# Patient Record
Sex: MALE
[De-identification: names, ages, dates, MRNs, and addresses within clinical notes are randomized per-mention and may not be internally consistent; named-entity substitution may affect disease eponyms.]

---

## 2018-11-30 NOTE — CT
CT BRAIN WITHOUT CONTRAST:

 

Date:  11/30/18 

 

HISTORY:  

Headache. 

 

FINDINGS:

No evidence of infarct, hemorrhage, midline shift, or abnormal extra-axial fluid collections are seen
. The ventricular size is normal and the basilar cisterns are patent. The bony calvarium is intact. T
he visualized paranasal sinuses and mastoid air cells are well aerated. 

 

IMPRESSION: 

No CT evidence of acute intracranial process.  

 

POS: [facility]

## 2018-11-30 NOTE — RAD
CHEST 1 VIEW:

 

Date:  11/30/18 

 

INDICATION:

Left leg pain and tingling and left arm pain. 

 

FINDINGS:

There is moderate cardiomegaly, stable to comparison dated 04/16/15. No definite consolidation, pleur
al effusion, or pneumothorax is evident. No acute osseous abnormality is evident. Body habitus slight
ly limits image detail. 

 

IMPRESSION: 

No definite acute abnormality within limitations of exam. Stable cardiomegaly. 

 

 

POS: [facility]

## 2018-11-30 NOTE — HP
REASON FOR ADMISSION:  Left-sided paraesthesias, demand ischemia.



HISTORY OF PRESENTING ILLNESS:  The patient gives history of having left-sided

paraesthesia for almost a week now.  This was mostly in the left upper thigh 
area.

From last 3 days, he developed paraesthesias and numbness, specifically 
numbness and

burning sensation in the left hand from last 3 days.  He went to work this 
morning,

felt dizzy at work and finally made it to the emergency room here.  The patient 
has

not been feeling good and also is sleeping more than his usual sleep hours.  He 
got

concerned, hence came to emergency room.  He works as a supervisor in an 
oilfield

company.  The patient states that he has had a coronary angiogram done 3 years 
back,

which was normal by Dr. Angel.  No prior history of stroke.  He says he is

compliant with his CPAP machine for sleep apnea.  No cough or expectoration.  No

history of fever.  Currently, he is moving all extremities. 



PAST MEDICAL AND SURGICAL HISTORY:  

1. Morbid obesity.

2. Right wrist tendon injury with repair and weakness in the hand secondary to 
that.

3. Obstructive sleep apnea, on CPAP.

4. Seasonal allergies.

5. Cardiac cath done 3 years ago, was normal.



CURRENT MEDICATIONS:  None.



ALLERGIES:  NO KNOWN DRUG ALLERGIES.



PERSONAL HISTORY:  Does not abuse alcohol or drugs.  No history of smoking.



FAMILY HISTORY:  Mother has a history of hypertension and is at bedside here.

Father  of motor vehicle accident at the age of 59 years. 



Code status is full.  Power of  is his mom.



REVIEW OF SYSTEMS:  CONSTITUTIONAL:  Negative for weight loss or gain, ability 
to

conduct usual activities. 

SKIN:  Negative for rash, itching. 

EYES:  Negative for double vision, pain. 

ENT/MOUTH:  Negative for nose bleeding, neck stiffness, pain, tenderness. 

CARDIOVASCULAR:  Negative for palpitations, dyspnea on exertion, orthopnea. 

RESPIRATORY:  Negative for shortness of breath, wheezing, cough, hemoptysis, 
fever

or night sweats. 

GASTROINTESTINAL:  Negative for poor appetite, abdominal pain, heartburn, nausea
,

vomiting, constipation, or diarrhea. 

GENITOURINARY:  Negative for urgency, frequency, dysuria, nocturia. 

MUSCULOSKELETAL:  Negative for pain, swelling. 

NEUROLOGIC/PSYCHIATRIC:  Negative for anxiety, depression. 

ALLERGY/IMMUNOLOGIC:  Negative for skin rash, bleeding tendency.



PHYSICAL EXAMINATION:

GENERAL:  The patient is a 48-year-old male, who is currently not in any acute

distress. 

VITAL SIGNS:  Blood pressure 134/81, pulse 58 per minute, respiratory rate 20 
per

minute, temperature 97.8 degrees Fahrenheit, and saturating 93% on room air. 

NECK:  Supple.  No elevated JVD. 

HEENT:  Eyes; extraocular muscles intact.  Pupils reacting to light.  Oral 
cavity;

mucous membranes are moist.  No exudates or congestion. 

CARDIOVASCULAR:  S1 and S2 heard.  Regular rhythm. 

RESPIRATORY:  Air entry 1+ bilateral.  No rales or rhonchi. 

ABDOMEN:  Soft.  Bowel sounds heard.  No tenderness, rigidity, or guarding. 

EXTREMITIES:  No peripheral edema or calf tenderness. 

VASCULAR SYSTEM:  Peripheral pulses 2+ bilateral.  No ischemic ulcerations or

gangrenes. 

CENTRAL NERVOUS SYSTEM:  Cranial nerves are grossly intact.  Motor system; 
strength

is 5/5 in all 4 extremities except right wrist area, where he has had prior 
tendon

injury with repair.  Reflexes are 2+ bilateral.  Babinski is downgoing.  
Cerebellar

signs are intact.  Gait was not tested. 

PSYCHIATRIC:  The patient's mood is euthymic.  No hallucinations or delusions.



LABORATORY DATA:  CT angio head done shows good flow in the common carotid, 
internal

carotid arteries and major branches.  In the vertebrobasilar system, no 
aneurysm or

significant stenosis or occlusion seen.  No cervical lymphadenopathy. 



CT of brain without contrast done shows no acute intracranial abnormalities. 



Chest x-ray done shows no acute cardiopulmonary abnormalities, but there is mild

cardiomegaly. 



White count of 5, H and H of 14 and 41, platelet count 147, MCV is 93 with 57%

neutrophils.  PT/INR/PTT within normal limits.  Electrolytes are stable, BUN 14,

creatinine 0.8, serum glucose 87.  CK level is 387.  Troponin I is indeterminate
,

peaking up to 0.14.  CK-MB 5.6.  BNP is 41. 



CLINICAL IMPRESSION AND PLAN:  The patient will be under observation on stroke 
unit

for left-sided paraesthesias to rule out transient ischemic attack.  We will 
obtain

MRI without contrast in the morning.  He also has demand ischemia likely from 
his

paraesthesias.  He has had a normal coronary angiogram done just in  with no

flow-limiting disease seen.  He is morbidly obese and also has obstructive sleep

apnea.  We will obtain vitamin B12 and folic acid levels.  He will be on normal

saline at 80 mL per hour for a total of 2 L.  He will be on nitropaste 0.5 inch

every 8 hourly along with the full dose of aspirin and Lipitor at 40 mg q.h.s.  
A

small dose of Lopressor for cardiac protection.  Echo with 2D Doppler will be

obtained for LV function.  We will obtain consultation with Dr. Recinos for

Cardiology and Neurology on-call as well.  The patient is morbidly obese and 
weighs

around 172 kilos.  He has life-threatening obesity and needs to work towards 
losing

his weight with healthy eating and exercising.  The patient had prior interest 
in

doing gastric sleeve procedure 3 years ago, but has given up on that per prior 
records. 







Job ID:  [id number]



MTDD

## 2018-11-30 NOTE — CT
CTA HEAD WITH IV CONTRAST AND 3D POSTPROCESSING 

CTA NECK WITH IV CONTRAST AND 3D POSTPROCESSING:

 

Date:  11/30/18 

 

HISTORY:  

Headache. Left leg pain and tingling for 5 days. Left arm pain for 3 days. 

 

FINDINGS:

There is good flow in the common carotid, internal carotid arteries and their major branches, and the
 vertebrobasilar system. No aneurysm or significant stenosis or major branch occlusion is seen. No ce
rvical lymphadenopathy is seen. The airway is patent. There are degenerative changes in the spine. 

 

IMPRESSION: 

Unremarkable exam. 

 

 

 

[facility]: [facility]

## 2018-12-01 NOTE — PDOC.PN
- Subjective


Encounter Start Date: 12/01/18


Encounter Start Time: 11:30


Subjective: still has left sided parasthesias (numbness and tingling), is 

moving well


-: no chest pain or sob or palp





- Objective


Resuscitation Status - Order Detail:





11/30/18 18:50


Resuscitation Status Routine 


   Resuscitation Status: FULL: Full Resuscitation








MAR Reviewed: Yes


Vital Signs & Weight: 


 Vital Signs (12 hours)











  Temp Pulse Resp BP BP Pulse Ox


 


 12/01/18 11:59  98.5 F  54 L  20   121/81  94 L


 


 12/01/18 08:00  98.3 F  52 L  18   128/78  92 L


 


 12/01/18 06:18   52 L   128/77  


 


 12/01/18 04:00  97.3 F L  56 L  19   119/68  93 L








 Weight











Weight                         379 lb 11.2 oz














I&O: 


 











 11/30/18 12/01/18 12/02/18





 06:59 06:59 06:59


 


Intake Total  645 


 


Balance  645 











Result Diagrams: 


 12/01/18 05:40





 12/01/18 05:40





Phys Exam





- Physical Examination


HEENT: PERRLA, moist MMs


Neck: no JVD, supple


Respiratory: no wheezing, no rales


Cardiovascular: RRR, no significant murmur


Gastrointestinal: soft, non-tender, positive bowel sounds


Musculoskeletal: no edema, pulses present


Neurological: non-focal, moves all 4 limbs


Psychiatric: normal affect, A&O x 3





Dx/Plan


(1) TIA (transient ischemic attack)


Code(s): G45.9 - TRANSIENT CEREBRAL ISCHEMIC ATTACK, UNSPECIFIED   Status: 

Acute   





(2) Demand ischemia of myocardium


Code(s): I24.8 - OTHER FORMS OF ACUTE ISCHEMIC HEART DISEASE   Status: Acute   





(3) Morbid obesity


Code(s): E66.01 - MORBID (SEVERE) OBESITY DUE TO EXCESS CALORIES   Status: 

Chronic   





(4) MAGGIE (obstructive sleep apnea)


Code(s): G47.33 - OBSTRUCTIVE SLEEP APNEA (ADULT) (PEDIATRIC)   Status: Chronic

   





(5) Dyslipidemia


Code(s): E78.5 - HYPERLIPIDEMIA, UNSPECIFIED   Status: Acute   





- Plan


await MRI and echo results


-: had normal cath 3 yrs back


-: no motor deficits clinically


-: add tricor and lipitor to asp


-: dc lovenox full dose and nitropaste (asymptomatic with prior N cath)





* .


await cardio and neuro opinion.





Review of Systems





- Medications/Allergies


Allergies/Adverse Reactions: 


 Allergies











Allergy/AdvReac Type Severity Reaction Status Date / Time


 


No Known Allergies Allergy   Verified 12/01/18 01:30











Medications: 


 Current Medications





Acetaminophen (Tylenol)  650 mg PO Q4H PRN


   PRN Reason: Headache/Fever/Mild Pain (1-3)


   Last Admin: 12/01/18 08:55 Dose:  650 mg


Aspirin (Ecotrin)  325 mg PO DAILY [facility]


   Last Admin: 12/01/18 08:55 Dose:  325 mg


Atorvastatin Calcium (Lipitor)  40 mg PO HS [facility]


   Last Admin: 11/30/18 21:56 Dose:  40 mg


Calcium Carbonate (Tums)  1,000 mg PO Q4H PRN


   PRN Reason: Heartburn  or Indigestion


Famotidine (Pepcid)  20 mg PO BID [facility]


   Last Admin: 12/01/18 08:55 Dose:  20 mg


Guaifenesin/Dextromethorphan (Robitussin Dm)  15 ml PO Q4H PRN


   PRN Reason: Cough


Sodium Chloride (Normal Saline 0.9%)  1,000 mls @ 80 mls/hr IV .[id number] [facility]


   Stop: 12/01/18 19:59


   Last Admin: 12/01/18 09:47 Dose:  1,000 mls


Metoprolol Tartrate (Lopressor)  12.5 mg PO BID [facility]


   Last Admin: 12/01/18 09:47 Dose:  12.5 mg


Sodium Chloride (Flush - Normal Saline)  10 ml IVF PRN PRN


   PRN Reason: Saline Flush

## 2018-12-01 NOTE — CON
DATE OF CONSULTATION:  12/01/2018



REASON FOR NEUROLOGY CONSULT:  Left-sided paresthesias.



HISTORY OF PRESENT ILLNESS:  This is a 48-year-old male, who states that for about a

week, he has had symptoms on his left side.  He states that his left upper leg

experiences a burning pain and numbness on the anterolateral aspect of his left

thigh.  It is made worse if he walks.  It gets better if he sits.  It may last for

few seconds or few minutes.  The other thing he has experienced is he gets a burning

pain and numbness in his left arm.  He says it seems to start in his fingers, mainly

the middle three fingers on the left hand.  It often occurs during sleep and wakes

him up.  He states that there is a burning pain associated with this that emanates

from his fingers up his arm towards his shoulder.  He says that it seems to get

worse during sleep.  He typically sleeps on his back and holds the pillow to his

chest with his arms crossed over the pillow.  He uses a CPAP machine.  He has been

on CPAP for severe obstructive sleep apnea over the last 8 years.  He states that

his first CPAP worked very well, and he would feel very rested after 4 hours of

sleep.  Now on the current CPAP, he sleeps 12 or 13 hours and does not feel rested

when he wakes up.  His chronic medical problems are significant for sleep apnea,

severe obesity, chronic right hand weakness due to an old tendon injury on his right

hand. 



PAST MEDICAL HISTORY:  Significant for sleep apnea.  He has had a surgery couple of

years ago for what was thought to be a chest tumor.  He underwent thoracotomy on the

lateral side of the chest.  He states that it turned out to be just part of his

enlarged heart and not a tumor.  He states that he used to be in senior care in the past,

and while he was in senior care, he was on a more healthier diet than he is now, and he

lost 160 pounds.  Gradually, since he has been out of senior care, he eats more fast food

and has been gradually gaining more weight.  He has a history of chest pain in the

past, but he had a cardiac cath, which was negative several years ago.  He has had

surgery on his right wrist for the tendon damage. 



SOCIAL HISTORY:  He does not smoke or drink.  He drives truck a lot, hours a day for

oil refinery, and he also drives a tractor a lot. 



FAMILY HISTORY:  Positive for coronary artery disease and diabetes.



REVIEW OF SYSTEMS:  A 14-point review of systems is negative except for the numbness

and pain episodes, also sleeping a lot.  He also noted that he has filters in his

house, which has an old central air unit, will get very dirty in just a matter of a

couple of weeks.  He and his brother live there at the house.  They have been

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

be that is contributing to some of his problems. 



His mother is present at this time.



PHYSICAL EXAMINATION:

GENERAL:  He is awake, alert, oriented x3.  In general, he is morbidly obese. 

VITAL SIGNS:  Weight is recorded in the chart at 379 pounds. 

HEENT:  Normal. 

LUNGS:  Clear. 

HEART:  No murmurs.  Regular rhythm. 

ABDOMEN:  Protuberant.  No masses. 

EXTREMITIES:  No clubbing, cyanosis or edema. 

SKIN:  No rashes. 

MUSCULOSKELETAL:  Joints, no swelling.  Right hand fingers are little weak with

limited range of motion.  There is a positive Phalen's sign on flexion of his left

wrist. 

NEUROLOGIC:  He is awake, alert, and oriented x3.  Cranial nerves 2 through 12

tested normally.  Pupils 2 mm and reactive.  Discs are sharp.  Motor, 5/5 strength

in the arms and legs except for the right hand chronic injury to the tendon.  DTRs

are 1+.  Toes are downgoing.  Sensation is normal to light touch.  Coordination,

finger to nose and heel to shin is normal.  Gait normal. 



REVIEW OF STUDIES:  Show CT of the brain and CT of the neck, does not show any

significant stenosis of the arteries.  CAT scan of the brain without contrast is

normal.  Note that an MRI of the brain was ordered; however, the MRI machine cannot

accommodate him due to size.  Other test results; hemoglobin is 13.7, hematocrit

40.7, platelets 143,000, white count 5.1.  Troponins were elevated.  There were

several, 0.134, 0.140, 0.139, and 0.140.  Normal is less than 0.028.  Sodium is 136,

potassium 4.5, chloride 107, CO2 of 22, BUN 17, creatinine 0.82, glucose was 95,

calcium 8.7.  Triglycerides elevated at 271.  Cholesterol 187.  LDL 93, HDL 40, B12

level is 987, which is okay.  Folate is 12.30, which is normal.  Pro-time 13.0, INR

1.0, PTT is 32.8. 



MEDICATIONS:  Currently, he is on;

1. Lipitor.

2. Pepcid.

3. Tricor.

4. Metoprolol.

5. Aspirin 325 mg a day.



IMPRESSION:  

1. Episodes of left arm burning pain and tingling and left thigh burning and

tingling.  He did also notice some numbness of the left face.  These symptoms could

be consistent with transient ischemic attacks; however, could also be consistent and

probably more likely due to peripheral nerve problems, possibly cervical

radiculopathy and/or left carpal tunnel syndrome, also possibility of lumbar

radiculopathy and/or lateral cutaneous nerve of thigh syndrome on the left.  Of

note, the MRI machine here cannot accommodate him. 

2. Also I suspect that his sleep apnea has not been adequately treated because he is

sleeping 12 or 13 hours a day and wakes up feeling un-rested.  This can also lead to

strokes or cardiac problems or heart attacks or arrhythmias, which could in turn

lead to transient ischemic attacks or strokes. 



PLAN:  Recommend echocardiogram, and I will see if that has been ordered.  Also

recommend  to help him get an MRI of the brain, MRI of the cervical

spine, and MRI of the lumbar spine.  These could of course be done as an outpatient.

 The patient also needs to be set up with an outpatient neurologist, so that he can

get nerve testing for cervical and/or lumbar radiculopathy and possible carpal

tunnel syndrome on the left.  Also recommend that he see a sleep specialist and have

a sleep study done with his current CPAP.  This may need to be adjusted or he may

need to have a new machine done.  It appears that his sleep apnea is not being

adequately treated, which can lead to heart problem, strokes, arrhythmias, and also

more weight gain.  He sees a Cardiology.  He has also been consulted for his

elevated troponins.  I recommend that his thyroid be checked, and hemoglobin A1c.  I

see that a random blood sugar was not elevated; however, it does run in his family,

in his brother.  Also recommend that he try not to burn the candles in his house and

may need to have his air conditioning unit replaced or inspected and possibly the

air ducts cleaned or replaced.  I discussed with the patient, his mother is also

present. 





Job ID:  [id number]

## 2018-12-02 NOTE — PDOC.PN
- Subjective


Encounter Start Date: 12/02/18


Encounter Start Time: 09:00


Subjective: no new complaints


-: is ambulating in room


-: no chest pain or palp





- Objective


Resuscitation Status - Order Detail:





11/30/18 18:50


Resuscitation Status Routine 


   Resuscitation Status: FULL: Full Resuscitation








MAR Reviewed: Yes


Vital Signs & Weight: 


 Vital Signs (12 hours)











  Temp Pulse Resp BP Pulse Ox


 


 12/02/18 11:41  98.8 F  54 L  18  143/83 H  95


 


 12/02/18 08:00  97.9 F  52 L  16  137/77  95


 


 12/02/18 05:41      95


 


 12/02/18 04:00  97.8 F  48 L  19  135/78  95


 


 12/02/18 00:00  97.7 F  57 L  19  123/84  94 L








 Weight











Weight                         379 lb 11.2 oz














I&O: 


 











 12/01/18 12/02/18 12/03/18





 06:59 06:59 06:59


 


Intake Total 645 1347 


 


Balance 645 1347 











Result Diagrams: 


 12/01/18 05:40





 12/01/18 05:40





Phys Exam





- Physical Examination


HEENT: PERRLA, moist MMs


Neck: no JVD, supple


Respiratory: no wheezing, no rales


Cardiovascular: RRR, no significant murmur


Gastrointestinal: soft, non-tender, positive bowel sounds


Musculoskeletal: no edema, pulses present


Neurological: non-focal, moves all 4 limbs


Psychiatric: normal affect, A&O x 3





Dx/Plan


(1) TIA (transient ischemic attack)


Code(s): G45.9 - TRANSIENT CEREBRAL ISCHEMIC ATTACK, UNSPECIFIED   Status: 

Acute   





(2) Demand ischemia of myocardium


Code(s): I24.8 - OTHER FORMS OF ACUTE ISCHEMIC HEART DISEASE   Status: Acute   





(3) Morbid obesity


Code(s): E66.01 - MORBID (SEVERE) OBESITY DUE TO EXCESS CALORIES   Status: 

Chronic   





(4) MAGGIE (obstructive sleep apnea)


Code(s): G47.33 - OBSTRUCTIVE SLEEP APNEA (ADULT) (PEDIATRIC)   Status: Chronic

   





(5) Dyslipidemia


Code(s): E78.5 - HYPERLIPIDEMIA, UNSPECIFIED   Status: Acute   





- Plan


hemo/neurostable


-: Cannot fit in MRI machine, no focal motor deficits


-: is going for stress test, dc home if -ve


-: echo showed normal ef and wm


-: on asp, lipitor and tricor, dc lopressor (HR 50's)





* .








Review of Systems





- Medications/Allergies


Allergies/Adverse Reactions: 


 Allergies











Allergy/AdvReac Type Severity Reaction Status Date / Time


 


No Known Allergies Allergy   Verified 12/01/18 01:30











Medications: 


 Current Medications





Acetaminophen (Tylenol)  650 mg PO Q4H PRN


   PRN Reason: Headache/Fever/Mild Pain (1-3)


   Last Admin: 12/01/18 08:55 Dose:  650 mg


Aspirin (Ecotrin)  325 mg PO DAILY [facility]


   Last Admin: 12/02/18 09:43 Dose:  325 mg


Atorvastatin Calcium (Lipitor)  40 mg PO HS [facility]


   Last Admin: 12/01/18 20:42 Dose:  40 mg


Calcium Carbonate (Tums)  1,000 mg PO Q4H PRN


   PRN Reason: Heartburn  or Indigestion


Famotidine (Pepcid)  20 mg PO BID [facility]


   Last Admin: 12/02/18 09:44 Dose:  20 mg


Fenofibrate (Tricor)  48 mg PO DAILY [facility]


   Last Admin: 12/02/18 09:43 Dose:  48 mg


Guaifenesin/Dextromethorphan (Robitussin Dm)  15 ml PO Q4H PRN


   PRN Reason: Cough


Sodium Chloride (Flush - Normal Saline)  10 ml IVF PRN PRN


   PRN Reason: Saline Flush


   Last Admin: 12/02/18 09:44 Dose:  10 ml

## 2018-12-03 NOTE — NM
NUCLEAR MEDICINE CARDIAC PERFUSION EXAMINATION WITH EJECTION FRACTION:

 

COMPARISON:  

None. 

 

HISTORY:  

48-year-old male with chest pain. 

 

TECHNIQUE:  

A two day nuclear medicine cardiac perfusion examination was performed. Rest images were obtained usi
ng 30.9 mCi of technetium-99m sestamibi. Stress images were obtained using 33 mCi of technetium-99m s
estamibi and Lexiscan. 

 

FINDINGS:

Tomographic images show no fixed or reversible perfusion defects. Gated images show normal wall motio
n with an ejection fraction of 67%. 

 

EDV:  152 ml

LHR:  0.2

TID:  0.9

 

IMPRESSION: 

No evidence of ischemia.  

 

POS: [facility]

## 2018-12-03 NOTE — DIS
DATE OF ADMISSION:  11/30/2018



DATE OF DISCHARGE:  12/03/2018



DISCHARGE DISPOSITION:  Home.



PRIMARY DISCHARGE DIAGNOSES:  Transient ischemic attack, resolved; demand 
ischemia,

stable with negative stress test. 



SECONDARY DISCHARGE DIAGNOSES:  Morbid obesity; obstructive sleep apnea, on CPAP
;

dyslipidemia. 



PROCEDURES DONE DURING HOSPITALIZATION:  Echo with 2D Doppler showed an EF of 50
% to

55%.  Nuclear stress test done showed no reversible ischemia.  There were no 
fixed

defects either.  Ejection fraction was 67% with normal wall motion.  CT angio of

brain was unremarkable.  CT of brain done without contrast showed no acute

intracranial process.  Chest x-ray done showed no acute infiltrate.  Hemoglobin 
and

hematocrit were 13 and 40, platelet count 143.  Total cholesterol 187, 
triglycerides

271, LDL 93, HDL 40.  B12 level is 987.  Folic acid 12.3.  TSH 1.76.  Troponin 
was

indeterminate peaking up to 0.14, CK-MB 5.6. 



DISCHARGE MEDICATIONS:  

1. Aspirin 325 mg p.o. daily.

2. Tricor 48 mg p.o. daily.

3. Lipitor 40 mg p.o. daily.



ALLERGIES:  NO KNOWN DRUG ALLERGIES.



INPATIENT CONSULTS:  Dr. Amina Galeas for Neurology, Dr. Recinos for Cardiology.



DISCHARGE PLAN:  The patient to follow up with primary care physician in 1 week.



BRIEF COURSE DURING HOSPITALIZATION:  The patient initially got admitted with

complaints of left-sided tingling and numbness.  This was more in the hand area 
and

left thigh area.  The patient has had initial CT brain without contrast and CT 
angio

of brain done which did not reveal any acute abnormality.  He had indeterminate

troponin as well.  He was admitted to stroke unit and has had a complete neuro 
and

cardiac workup done.  The patient has had nuclear stress test done, which 
showed no

fixed or reversible defects.  In fact, he has had a cardiac catheterization 
done 3

years back, which showed no flow-limiting disease.  He was evaluated by

neurologist as well.  The patient will need outpatient EMG studies via his 
primary

care physician's referral.  The patient also has some issues with his CPAP 
machine

and has had sleep studies done in the last three years or so.  He was advised to

call his pulmonologist or sleep medicine physician and rectify the issues.  He 
is

ambulating and eating well.  No focal motor deficits were noted.  He was 
optimized

on medications for hypertriglyceridemia and dyslipidemia.  He is hemodynamically

stable and will be shortly discharged home. Patient was seen and examined on 
the day

of discharge. Also  was counselled regarding his life threatening 
obesity and 

options of bariatric procedure if he failed diet and exercise regimens.







Job ID:  [id number]



MTDD

## 2018-12-03 NOTE — CON
DATE OF CONSULTATION:  



HISTORY OF PRESENT ILLNESS:  Maximo Jimenez is a 48-year-old  male,

who has been evaluated by Dr. Angel in the past.  He was seen in the office for

preoperative evaluation prior to gastric sleeve surgery, and a stress test was

scheduled.  However, Mr. Jimenez decided against bariatric surgery and also did not

have the stress test.  He then presented in April 2015, with chest discomfort with 3

troponins in the indeterminate range.  He underwent cardiac catheterization, which

revealed normal coronary arteries. 



He now is admitted with complaints of left thigh pain as well as development of

paresthesias and pain in his left arm.  He denies any chest discomfort or shortness

of breath.  He states that he had been feeling very fatigued and decided to come for

evaluation. 



PAST MEDICAL HISTORY:  Morbid obesity, obstructive sleep apnea, and normal coronary

arteries in 2015. 



PAST SURGICAL HISTORY:  Right wrist surgery.  He also states that he underwent a

left thoracotomy to remove a tumor; however, apparently, none was found at the time

of the surgery.  This was in [place]. 



MEDICATIONS:  None.



ALLERGIES:  NONE.



SOCIAL HISTORY:  He does not smoke or drink.



FAMILY HISTORY:  Negative for coronary artery disease.



REVIEW OF SYSTEMS:  Twelve-point review of systems is unremarkable except as noted

above. 



PHYSICAL EXAMINATION:

VITAL SIGNS:  Blood pressure 135/78, pulse of 48.  He was on metoprolol 12.5 b.i.d.,

however, this has been discontinued. 

HEENT:  PERRL. 

NECK:  Supple. 

CHEST:  Clear. 

CARDIAC:  S1 and S2 are normal without any S3, S4, or murmurs.  Carotid upstroke is

normal without bruits. 

ABDOMEN:  Morbidly obese.  Normal bowel sounds.  No tenderness. 

EXTREMITIES:  Reveal trace pretibial edema. 

NEUROLOGICAL:  Grossly intact.



LABORATORY DATA:  EKG reveals sinus bradycardia with nonspecific intraventricular

conduction block.  Hemoglobin 13.7, hematocrit 40.7, white count 5100, and platelets

143,000.  Sodium 136, potassium 4.5, chloride 107, carbon dioxide 22, BUN 17, and

creatinine 0.82.  Troponin I has been up to 0.140.  Cholesterol 187, triglycerides

271, HDL 40, and LDL 93.  TSH is normal.  CK-MB is normal. 



IMPRESSION:  

1. Left leg pain, left arm pain and paresthesias, currently being evaluated by

Neurology. 

2. Indeterminate troponin I with history of normal coronary arteries 3 years ago.

3. Morbid obesity.

4. Obstructive sleep apnea.

5. History of left thoracotomy.



PLAN:  Mr. Jimenez had normal coronary arteries on catheterization 3 years ago.

Also, at that time, he had indeterminate troponin I.  However, they certainly are

higher this time.  He will undergo adenosine Cardiolite testing for further

evaluation.  I agree with discontinuation of the beta-blocker with his bradycardia. 







Job ID:  [id number]

## 2018-12-03 NOTE — PDOC.PN
- Subjective


Encounter Start Date: 12/03/18


Encounter Start Time: 09:00


Subjective: no chest pain or sob


-: no new symptoms


-: is amb in room





- Objective


Resuscitation Status - Order Detail:





11/30/18 18:50


Resuscitation Status Routine 


   Resuscitation Status: FULL: Full Resuscitation








MAR Reviewed: Yes


Vital Signs & Weight: 


 Vital Signs (12 hours)











  Temp Pulse Resp BP Pulse Ox


 


 12/03/18 07:30  98.6 F  54 L  18  139/80  95


 


 12/03/18 04:00  97.6 F  60  18  119/90  94 L


 


 12/02/18 23:46  98.5 F  54 L  18  134/72  94 L








 Weight











Weight                         379 lb














I&O: 


 











 12/02/18 12/03/18 12/04/18





 06:59 06:59 06:59


 


Intake Total 1347 1080 


 


Balance 1347 1080 











Result Diagrams: 


 12/01/18 05:40





 12/01/18 05:40





Phys Exam





- Physical Examination


HEENT: PERRLA, moist MMs


Neck: no JVD, supple


Respiratory: no wheezing, no rales


Cardiovascular: RRR, no significant murmur


Gastrointestinal: soft, non-tender, positive bowel sounds


Musculoskeletal: no edema, pulses present


Neurological: non-focal, moves all 4 limbs


Psychiatric: normal affect, A&O x 3





Dx/Plan


(1) TIA (transient ischemic attack)


Code(s): G45.9 - TRANSIENT CEREBRAL ISCHEMIC ATTACK, UNSPECIFIED   Status: 

Resolved   





(2) Demand ischemia of myocardium


Code(s): I24.8 - OTHER FORMS OF ACUTE ISCHEMIC HEART DISEASE   Status: Acute   

Comment: stable   





(3) Morbid obesity


Code(s): E66.01 - MORBID (SEVERE) OBESITY DUE TO EXCESS CALORIES   Status: 

Chronic   





(4) MAGGIE (obstructive sleep apnea)


Code(s): G47.33 - OBSTRUCTIVE SLEEP APNEA (ADULT) (PEDIATRIC)   Status: Chronic

   





(5) Dyslipidemia


Code(s): E78.5 - HYPERLIPIDEMIA, UNSPECIFIED   Status: Acute   





- Plan


await stress test results, if -ve home


-: hemo/neuro stable


-: needs outpt emg and f/u


-: continue asp, lip, tricor





* .








Review of Systems





- Medications/Allergies


Allergies/Adverse Reactions: 


 Allergies











Allergy/AdvReac Type Severity Reaction Status Date / Time


 


No Known Allergies Allergy   Verified 12/01/18 01:30











Medications: 


 Current Medications





Acetaminophen (Tylenol)  650 mg PO Q4H PRN


   PRN Reason: Headache/Fever/Mild Pain (1-3)


   Last Admin: 12/01/18 08:55 Dose:  650 mg


Aspirin (Ecotrin)  325 mg PO DAILY [facility]


   Last Admin: 12/03/18 09:30 Dose:  325 mg


Atorvastatin Calcium (Lipitor)  40 mg PO HS [facility]


   Last Admin: 12/02/18 20:45 Dose:  40 mg


Calcium Carbonate (Tums)  1,000 mg PO Q4H PRN


   PRN Reason: Heartburn  or Indigestion


Famotidine (Pepcid)  20 mg PO BID [facility]


   Last Admin: 12/03/18 09:30 Dose:  20 mg


Fenofibrate (Tricor)  48 mg PO DAILY [facility]


   Last Admin: 12/03/18 09:34 Dose:  48 mg


Guaifenesin/Dextromethorphan (Robitussin Dm)  15 ml PO Q4H PRN


   PRN Reason: Cough


Sodium Chloride (Flush - Normal Saline)  10 ml IVF PRN PRN


   PRN Reason: Saline Flush


   Last Admin: 12/02/18 20:45 Dose:  10 ml

## 2018-12-03 NOTE — PRG
DATE OF SERVICE:  12/02/2018



NEUROLOGY FOLLOWUP NOTE



SUBJECTIVE:  Following up for left arm numbness and pain and left thigh numbness and

burning.  The patient states that he still has the same symptoms, which come and go.

 There is no weakness associated with this.  He does note that the numbness and

burning of the arm can wake him up from sleep.  He also uses a CPAP at home for

severe obstructive sleep apnea.  He does not think that his current machine is

working because he still sleeps 12 hours a day or more and still wakes up

unrefreshed. 



The MRI here was attempted, but not able to accommodate his size.



OBJECTIVE:  VITAL SIGNS:  Blood pressure 135/67, pulse 48, respiratory rate 18,

temperature 98.4, O2 saturation is 95% on room air. 

HEENT:  Normal. 

GENERAL:  He is morbidly obese with a recorded weight of 379 pounds. 

LUNGS:  Clear. 

EXTREMITIES:  No edema. 

NEUROLOGIC:  He is awake, alert.  Cranial nerves 2 through 12 test normally.  Motor;

5/5 strength in arms and legs.  DTRs are 1+.  Toes are downgoing.  Sensation is

intact except for a slight area of numbness in the lateral anterior area of the left

thigh. 



REVIEW OF TEST RESULTS:  Showed that CTA of the head and neck and CAT scan of the

brain were normal.  He had an echocardiogram, which showed technically difficult

exam.  Left atrium moderately dilated.  There was a chest x-ray done on 11/30/2018,

that showed moderate cardiomegaly stable to an exam that was done on April 16, 2015.

 There was no definite consolidation, pleural effusion, or pneumothorax.  Body

habitus slightly limited the exam detail. 



IMPRESSION:  Episodic numbness, tingling, and burning pain in the left hand, which

goes up his left arm and also in the anterolateral aspect of his left thigh.  The

symptoms could be consistent with transient ischemic attack, however, could also be

consistent with a cervical and/or lumbar radiculopathy and could be consistent with

left carpal tunnel syndrome and left meralgia paresthetica.  He also sleeps

excessive amount, and I suspect that his CPAP is not working. 



PLAN:   assistant to help the patient get an outpatient open MRI of the

brain, cervical spine, and lumbar spine, and also follow up with an outpatient

neurologist, so he can get nerve testing for cervical and lumbar radiculopathy and

possible carpel tunnel syndrome on the left, and also recommend that he see a sleep

specialist to have a sleep study done with his CPAP.  I will order a wrist splint

for his left upper extremity.  I do not have any further neurological

recommendations at this time.  Neurology will sign off. 







Job ID:  [id number]

## 2019-02-28 ENCOUNTER — HOSPITAL ENCOUNTER (EMERGENCY)
Dept: HOSPITAL 57 - BURERS | Age: 49
Discharge: HOME | End: 2019-02-28
Payer: COMMERCIAL

## 2019-02-28 ENCOUNTER — HOSPITAL ENCOUNTER (OUTPATIENT)
Dept: HOSPITAL 92 - ERS | Age: 49
Setting detail: OBSERVATION
LOS: 1 days | Discharge: HOME | End: 2019-03-01
Attending: INTERNAL MEDICINE | Admitting: INTERNAL MEDICINE
Payer: COMMERCIAL

## 2019-02-28 VITALS — BODY MASS INDEX: 51 KG/M2

## 2019-02-28 DIAGNOSIS — R74.8: Primary | ICD-10-CM

## 2019-02-28 DIAGNOSIS — E66.01: ICD-10-CM

## 2019-02-28 DIAGNOSIS — G47.33: ICD-10-CM

## 2019-02-28 DIAGNOSIS — Z79.82: ICD-10-CM

## 2019-02-28 DIAGNOSIS — R42: ICD-10-CM

## 2019-02-28 DIAGNOSIS — Z79.899: ICD-10-CM

## 2019-02-28 DIAGNOSIS — J96.90: Primary | ICD-10-CM

## 2019-02-28 DIAGNOSIS — Z99.89: ICD-10-CM

## 2019-02-28 DIAGNOSIS — E78.00: ICD-10-CM

## 2019-02-28 DIAGNOSIS — E78.5: ICD-10-CM

## 2019-02-28 LAB
ALBUMIN SERPL BCG-MCNC: 4.2 G/DL (ref 3.5–5)
ALP SERPL-CCNC: 54 U/L (ref 40–150)
ALT SERPL W P-5'-P-CCNC: 40 U/L (ref 8–55)
ANALYZER IN CARDIO: (no result)
ANION GAP SERPL CALC-SCNC: 11 MMOL/L (ref 10–20)
APAP SERPL-MCNC: (no result) MCG/ML (ref 10–30)
AST SERPL-CCNC: 32 U/L (ref 5–34)
BASE EXCESS STD BLDA CALC-SCNC: 2.2 MEQ/L
BASOPHILS # BLD AUTO: 0 THOU/UL (ref 0–0.2)
BASOPHILS NFR BLD AUTO: 0.9 % (ref 0–1)
BILIRUB SERPL-MCNC: 0.5 MG/DL (ref 0.2–1.2)
BUN SERPL-MCNC: 22 MG/DL (ref 8.9–20.6)
CA-I BLDA-SCNC: 1.16 MMOL/L (ref 1.12–1.3)
CALCIUM SERPL-MCNC: 9.1 MG/DL (ref 7.8–10.44)
CHLORIDE SERPL-SCNC: 108 MMOL/L (ref 98–107)
CK MB SERPL-MCNC: 5.5 NG/ML (ref 0–6.6)
CK MB SERPL-MCNC: 6.2 NG/ML (ref 0–6.6)
CK MB SERPL-MCNC: 7 NG/ML (ref 0–6.6)
CO2 SERPL-SCNC: 25 MMOL/L (ref 22–29)
CREAT CL PREDICTED SERPL C-G-VRATE: 0 ML/MIN (ref 70–130)
EOSINOPHIL # BLD AUTO: 0.2 THOU/UL (ref 0–0.7)
EOSINOPHIL NFR BLD AUTO: 3.6 % (ref 0–10)
GLOBULIN SER CALC-MCNC: 2.6 G/DL (ref 2.4–3.5)
GLUCOSE SERPL-MCNC: 107 MG/DL (ref 70–105)
HCO3 BLDA-SCNC: 26.7 MEQ/L (ref 22–28)
HCT VFR BLDA CALC: 42 % (ref 42–52)
HGB BLD-MCNC: 13.9 G/DL (ref 14–18)
HGB BLDA-MCNC: 14.3 G/DL (ref 14–18)
LYMPHOCYTES # BLD AUTO: 1.2 THOU/UL (ref 1.2–3.4)
LYMPHOCYTES NFR BLD AUTO: 24.6 % (ref 21–51)
MCH RBC QN AUTO: 31.2 PG (ref 27–31)
MCV RBC AUTO: 92 FL (ref 78–98)
MONOCYTES # BLD AUTO: 0.4 THOU/UL (ref 0.11–0.59)
MONOCYTES NFR BLD AUTO: 9.3 % (ref 0–10)
NEUTROPHILS # BLD AUTO: 2.9 THOU/UL (ref 1.4–6.5)
NEUTROPHILS NFR BLD AUTO: 61.6 % (ref 42–75)
O2 A-A PPRESDIFF RESPIRATORY: 24.86 MM[HG] (ref 0–20)
PCO2 BLDA: 41.1 MMHG (ref 35–45)
PH BLDA: 7.43 [PH] (ref 7.35–7.45)
PLATELET # BLD AUTO: 125 THOU/UL (ref 130–400)
PO2 BLDA: 73.5 MMHG (ref 80–100)
POTASSIUM BLD-SCNC: 3.73 MMOL/L (ref 3.7–5.3)
POTASSIUM SERPL-SCNC: 3.9 MMOL/L (ref 3.5–5.1)
RBC # BLD AUTO: 4.46 MILL/UL (ref 4.7–6.1)
SALICYLATES SERPL-MCNC: (no result) MG/DL (ref 15–30)
SODIUM SERPL-SCNC: 140 MMOL/L (ref 136–145)
SPECIMEN DRAWN FROM PATIENT: (no result)
TROPONIN I SERPL DL<=0.01 NG/ML-MCNC: 0.17 NG/ML (ref ?–0.03)
WBC # BLD AUTO: 4.7 THOU/UL (ref 4.8–10.8)

## 2019-02-28 PROCEDURE — 93005 ELECTROCARDIOGRAM TRACING: CPT

## 2019-02-28 PROCEDURE — 96360 HYDRATION IV INFUSION INIT: CPT

## 2019-02-28 PROCEDURE — 85379 FIBRIN DEGRADATION QUANT: CPT

## 2019-02-28 PROCEDURE — 85025 COMPLETE CBC W/AUTO DIFF WBC: CPT

## 2019-02-28 PROCEDURE — 36415 COLL VENOUS BLD VENIPUNCTURE: CPT

## 2019-02-28 PROCEDURE — 96372 THER/PROPH/DIAG INJ SC/IM: CPT

## 2019-02-28 PROCEDURE — 82553 CREATINE MB FRACTION: CPT

## 2019-02-28 PROCEDURE — 80053 COMPREHEN METABOLIC PANEL: CPT

## 2019-02-28 PROCEDURE — 84484 ASSAY OF TROPONIN QUANT: CPT

## 2019-02-28 PROCEDURE — 82550 ASSAY OF CK (CPK): CPT

## 2019-02-28 PROCEDURE — 82805 BLOOD GASES W/O2 SATURATION: CPT

## 2019-02-28 PROCEDURE — 80048 BASIC METABOLIC PNL TOTAL CA: CPT

## 2019-02-28 PROCEDURE — 80307 DRUG TEST PRSMV CHEM ANLYZR: CPT

## 2019-02-28 PROCEDURE — G0378 HOSPITAL OBSERVATION PER HR: HCPCS

## 2019-02-28 NOTE — HP
PRIMARY CARE PHYSICIAN:  Dr. Marjan Clarke.



CHIEF COMPLAINT:  Dizziness.



HISTORY OF PRESENTING ILLNESS:  Mr. Jimenez is a pleasant 48-year-old male with

past medical history of morbid obesity, sleep apnea, on CPAP at night as well as

history of dyslipidemia, who presented in the emergency room at [place] with the

above-mentioned complaint.  History is mainly obtained by the patient himself.

Electronic medical records have been reviewed. 



Mr. Jimenez was last admitted to our facility in 2018 and he underwent a

workup for possible TIA.  At that time, he underwent a cardiac stress test as well

as echocardiogram, which was unremarkable.  He had a CT angio of his head and neck,

also along with a CT of the brain, which was normal. 



He reports that he has been doing fairly well, but this morning while he was driving

in the car with his brother to work, he started to feel a sudden onset of severe

dizziness.  He had to stop the car and let his brother drive.  When they arrived to

work, his dizziness did not get better, and he was driven to the emergency room in

[place].  He continued to be dizzy over there as well.  He reports that he has been

having bouts of bronchitis for the last 2 months or so, treated by multiple rounds

of antibiotics by the PCP.  He still struggles with some chronic cough for the last

2 or 3 months.  He reports that he has traveled to [place] earlier this month, which

was a 3-hour flight and later he traveled to [place] by car a week later after that

trip.  He denies any shortness of breath or chest pain with these symptoms today.

He did break out in a sweat.  He denies any swelling or pain in his legs.  He states

that he is compliant with his CPAP machine and it is working properly, but he needs

to change the filter multiple times. 



Interestingly, he reports that every time he is out of his house, his symptoms are

much better.  His brother, who lives in the house with him suffer from the similar

symptoms of chronic bronchitis.  They feel that the house has mold.  He also reports

that he burned multiple candles around the clock nonstop for the last few years

because of some Gnosticist reasons.  These candles burn day and night and even right

now he has four candles burning in the house. 



Nevertheless, in the emergency room today, he was found to have indeterminate

troponin and was sent to our facility for further workup.  He was hemodynamically

stable upon presentation with oxygen saturation 93% on room air, blood pressure

133/81. 



He is noted to have chronically elevated troponin, but his troponin was elevated

more than the last time.  It was repeated in our emergency room and it did not

change much, but his CPK-MB came back elevated at this time at 7.0.  His total

creatine kinase was checked and was better.  It was mildly elevated at 389.  His EKG

did not have any specific changes except for right bundle branch block.  He was

noted to have a heart rate sometimes down to 40s or 50s without any symptoms.  He

has received aspirin in the emergency room and his IV fluids.  His dizziness is

better. 



He denies any recent changes in his medication.  No recent steroid use either.



PAST MEDICAL HISTORY:  

1. Morbid obesity.

2. Obstructive sleep apnea, on CPAP.

3. Seasonal allergies.

4. Cardiac cath done 3 years ago, which was normal.

5. Stress test done which was normal.



PAST SURGICAL HISTORY:  Right wrist tendon repair.



ALLERGIES:  NO KNOWN MEDICATION ALLERGIES.



SOCIAL HISTORY:  He denies any drug, tobacco, or alcohol abuse.  Lives at his own

home with his brother. 



FAMILY HISTORY:  Significant for hypertension in his mother.  Father  of motor

vehicle accident at the age of 59 years. 



CODE STATUS:  Full code.  Power of  is the mom.



HOME MEDICATIONS:  He takes 81 mg of aspirin and an unknown cholesterol pill.



REVIEW OF SYSTEMS:  A 12-point review of system was done.  It is negative except for

those mentioned in the history and physical. 



LABORATORY DATA:  His CBC shows WBCs at 4.7, otherwise unremarkable.  Serum

chemistries show chloride 108, BUN 22, with creatinine of 0.85.  His 1st troponin is

0.198 with repeat troponin of 0.196.  His CK-MB was initially 5.5, which jumped up

to 7.0.  Chest x-ray was not done.  The 12-lead EKG shows sinus bradycardia with

right bundle branch block by my review. 



PHYSICAL EXAMINATION:

VITAL SIGNS:  Upon presentation to the ER, blood pressure 160/97, pulse of 51,

respirations 16, saturating 95% on room air, and temperature 97.9. 

GENERAL:  No acute distress.  Lying comfortably in bed.  Awake, alert, and oriented

x3. 

HEENT:  Mucous membrane is moist and pink.  No oropharyngeal exudate or erythema.

Head is normocephalic, atraumatic.  Pupils are equal, reactive to light and

accommodation.  Extraocular movement intact. 

NECK:  Supple without any lymphadenopathy, JVD, or bruit. 

CHEST:  Clear to auscultation without any wheezing, rales, or rhonchi. 

ABDOMEN:  Morbidly obese, soft, nontender, nondistended.  Positive bowel sounds.

EXTREMITIES:  Free of any cyanosis, clubbing, or edema. 

NEUROLOGICAL:  Nonfocal. 

SKIN:  Free of any rashes or bruises.  Feels warm and dry to touch. 

PSYCHIATRIC:  Normal affect.



IMPRESSION AND PLAN:  

1. Dizziness.  The patient has had a fairly thorough cardiac and neurological workup

done less than 3 months ago, including a normal echocardiogram, a normal cardiac

stress test and a normal CT angio of the head and neck.  He does have elevated

troponin more than his baseline now along with elevation of his CK-MB.  He, however,

has mild elevation of his total creatine kinase as well, so I am not sure if his

CK-MB elevation is solely because of cardiac issues.  He will be admitted under

observation status and we will continue to trend serial cardiac enzymes and provide

him with a full dose of aspirin for now.  He will be monitored for any arrhythmias

or bradycardia causing his symptoms.  He has no neurological signs or symptoms at

this time. 

a. We will check a D-dimer given his recent history of travel and if it is abnormal,

we will do a CT angiogram to rule out pulmonary embolism. 

b. Also given his history of having multiple candles burning day and night for the

last few years, a small possibility of carbon monoxide poisoning is also not

completely ruled out.  Even though it is far fetched, we will go ahead and check an

ABG for carboxyhemoglobin level.  If it is elevated, he will be treated with

high-flow oxygen. 

c. The possibility of hypercarbia with baseline sleep apnea is also entertained.

ABG will be checked as above.  He can also have vestibular symptoms from recent

repeated bouts of bronchitis if all of the cardiac workup is negative. 

2. Morbid obesity.  Continue continuous positive airway pressure while in the

hospital. 

3. History of dyslipidemia.  Reconcile home medication once confirmed.

4. Morbid obesity.  The patient educated about diet and exercise program and

noninvasive weight loss procedures. 



DISPOSITION:  Mr. Jimenez is currently being admitted to the hospital with

indeterminate troponin and dizziness for further workup. 



Estimated length of stay at this time is less than two midnights.  Further

management will depend upon his clinical course. 







Job ID:  [id number]

## 2019-03-01 VITALS — TEMPERATURE: 98.3 F | SYSTOLIC BLOOD PRESSURE: 128 MMHG | DIASTOLIC BLOOD PRESSURE: 62 MMHG

## 2019-03-01 LAB
ANION GAP SERPL CALC-SCNC: 10 MMOL/L (ref 10–20)
BASOPHILS # BLD AUTO: 0 THOU/UL (ref 0–0.2)
BASOPHILS NFR BLD AUTO: 0.8 % (ref 0–1)
BUN SERPL-MCNC: 19 MG/DL (ref 8.9–20.6)
CALCIUM SERPL-MCNC: 9.1 MG/DL (ref 7.8–10.44)
CHLORIDE SERPL-SCNC: 107 MMOL/L (ref 98–107)
CO2 SERPL-SCNC: 26 MMOL/L (ref 22–29)
CREAT CL PREDICTED SERPL C-G-VRATE: 266 ML/MIN (ref 70–130)
EOSINOPHIL # BLD AUTO: 0.2 THOU/UL (ref 0–0.7)
EOSINOPHIL NFR BLD AUTO: 3.6 % (ref 0–10)
GLUCOSE SERPL-MCNC: 88 MG/DL (ref 70–105)
HGB BLD-MCNC: 13.4 G/DL (ref 14–18)
LYMPHOCYTES # BLD: 1.6 THOU/UL (ref 1.2–3.4)
LYMPHOCYTES NFR BLD AUTO: 24.8 % (ref 21–51)
MCH RBC QN AUTO: 32 PG (ref 27–31)
MCV RBC AUTO: 97.4 FL (ref 78–98)
MONOCYTES # BLD AUTO: 0.7 THOU/UL (ref 0.11–0.59)
MONOCYTES NFR BLD AUTO: 10.4 % (ref 0–10)
NEUTROPHILS # BLD AUTO: 3.8 THOU/UL (ref 1.4–6.5)
NEUTROPHILS NFR BLD AUTO: 60.4 % (ref 42–75)
PLATELET # BLD AUTO: 142 THOU/UL (ref 130–400)
POTASSIUM SERPL-SCNC: 3.9 MMOL/L (ref 3.5–5.1)
RBC # BLD AUTO: 4.18 MILL/UL (ref 4.7–6.1)
SODIUM SERPL-SCNC: 139 MMOL/L (ref 136–145)
WBC # BLD AUTO: 6.3 THOU/UL (ref 4.8–10.8)

## 2019-03-02 NOTE — DIS
DATE OF ADMISSION:  02/28/2019



DATE OF DISCHARGE:  03/01/2019



CONDITION:  At the time of discharge, stable and improved.



DISCHARGE DIAGNOSES:  

1. Dizziness of unclear etiology, likely vestibular in nature.

2. Morbid obesity.

3. Obstructive sleep apnea, on CPAP.



DISCHARGE MEDICATIONS:  

1. Aspirin 325 mg daily.

2. Atorvastatin 40 mg daily.



PRIMARY CARE PHYSICIAN:  Marjan Clarke DO



HISTORY OF PRESENTING ILLNESS:  Mr. Jimenez is a very pleasant 48-year-old

overweight male with past medical history of sleep apnea, who presented to the

emergency room with complaints of dizziness of sudden onset.  It was associated with

some diaphoresis, and he presented to outside emergency room.  He was recently

admitted to our facility in November 2018, at which time, he had a normal nuclear

medicine stress test and echocardiogram.  He also has had a negative cardiac

catheterization in 2015.  Upon presentation, he was hemodynamically stable.  His

initial cardiac enzymes were indeterminate, which seemed to be chronic for this

patient.  However, his rise in CK-MB prompted admission.  His D-dimer was negative

at the time of admission. 



HOSPITAL COURSE:  The patient remained stable throughout the hospitalization.  He

was on telemetry monitoring.  He did not have any arrhythmias.  He did have right

bundle-branch block on the EKG and his heart rate was running sometimes in high 40s

to high 50s.  He had some minor dizziness, but nothing major like that prompted him

to come to the hospital.  His repeat CK-MB was normal and cardiac enzymes trended

low normal too. 



At this time, it seems like his symptoms can be due to the bradycardia and right

bundle-branch block.  After discussing it with on-call cardiologist, Dr. Recinos,

graciously helped me set him up with a Holter monitor through his clinic.  He will

follow up with Cardiology Clinic with Dr. Angel who has seen him in the past.  He

is instructed about the use of CPAP machine, which he is compliant with. 



He is also found to be burning candles 24/7 due to some Restorationist reasons in the

house and feels that the house also has mold giving him allergies and chronic

bronchitis.  He is instructed to get the house inspected and move out if possible.

He is also instructed to stop burning the candles. 



As of this morning, he is hemodynamically stable and asymptomatic and feels better

and will be discharged home.  His blood pressure is 128/62, heart rate anywhere from

46 to 54.  No acute distress.  Chest clear to auscultation bilaterally, rate and

rhythm are regular. 







Job ID:  [id number]

## 2020-05-14 ENCOUNTER — HOSPITAL ENCOUNTER (INPATIENT)
Dept: HOSPITAL 92 - ERS | Age: 50
LOS: 6 days | Discharge: HOME | DRG: 243 | End: 2020-05-20
Attending: INTERNAL MEDICINE | Admitting: INTERNAL MEDICINE
Payer: COMMERCIAL

## 2020-05-14 VITALS — BODY MASS INDEX: 50.9 KG/M2

## 2020-05-14 DIAGNOSIS — I45.5: ICD-10-CM

## 2020-05-14 DIAGNOSIS — I51.89: ICD-10-CM

## 2020-05-14 DIAGNOSIS — R07.89: ICD-10-CM

## 2020-05-14 DIAGNOSIS — J20.9: ICD-10-CM

## 2020-05-14 DIAGNOSIS — Z82.49: ICD-10-CM

## 2020-05-14 DIAGNOSIS — J42: ICD-10-CM

## 2020-05-14 DIAGNOSIS — E78.5: ICD-10-CM

## 2020-05-14 DIAGNOSIS — I49.5: Primary | ICD-10-CM

## 2020-05-14 DIAGNOSIS — E78.00: ICD-10-CM

## 2020-05-14 DIAGNOSIS — J30.89: ICD-10-CM

## 2020-05-14 DIAGNOSIS — G47.33: ICD-10-CM

## 2020-05-14 DIAGNOSIS — E66.01: ICD-10-CM

## 2020-05-14 DIAGNOSIS — Z79.899: ICD-10-CM

## 2020-05-14 LAB
ALBUMIN SERPL BCG-MCNC: 4.3 G/DL (ref 3.5–5)
ALP SERPL-CCNC: 61 U/L (ref 40–110)
ALT SERPL W P-5'-P-CCNC: 30 U/L (ref 8–55)
ANION GAP SERPL CALC-SCNC: 13 MMOL/L (ref 10–20)
AST SERPL-CCNC: 24 U/L (ref 5–34)
BASOPHILS # BLD AUTO: 0 THOU/UL (ref 0–0.2)
BASOPHILS NFR BLD AUTO: 0.6 % (ref 0–1)
BILIRUB SERPL-MCNC: 0.4 MG/DL (ref 0.2–1.2)
BUN SERPL-MCNC: 23 MG/DL (ref 8.9–20.6)
CALCIUM SERPL-MCNC: 9.3 MG/DL (ref 7.8–10.44)
CHLORIDE SERPL-SCNC: 106 MMOL/L (ref 98–107)
CK MB SERPL-MCNC: 5.6 NG/ML (ref 0–6.6)
CK SERPL-CCNC: 243 U/L (ref 30–200)
CO2 SERPL-SCNC: 23 MMOL/L (ref 22–29)
CREAT CL PREDICTED SERPL C-G-VRATE: 0 ML/MIN (ref 70–130)
EOSINOPHIL # BLD AUTO: 0.2 THOU/UL (ref 0–0.7)
EOSINOPHIL NFR BLD AUTO: 2.8 % (ref 0–10)
GLOBULIN SER CALC-MCNC: 2.9 G/DL (ref 2.4–3.5)
GLUCOSE SERPL-MCNC: 89 MG/DL (ref 70–105)
HGB BLD-MCNC: 15.1 G/DL (ref 14–18)
LYMPHOCYTES # BLD: 1.4 THOU/UL (ref 1.2–3.4)
LYMPHOCYTES NFR BLD AUTO: 22 % (ref 21–51)
MCH RBC QN AUTO: 31.5 PG (ref 27–31)
MCV RBC AUTO: 96.4 FL (ref 78–98)
MONOCYTES # BLD AUTO: 0.6 THOU/UL (ref 0.11–0.59)
MONOCYTES NFR BLD AUTO: 9.4 % (ref 0–10)
NEUTROPHILS # BLD AUTO: 4 THOU/UL (ref 1.4–6.5)
NEUTROPHILS NFR BLD AUTO: 65.1 % (ref 42–75)
PLATELET # BLD AUTO: 140 THOU/UL (ref 130–400)
POTASSIUM SERPL-SCNC: 4.1 MMOL/L (ref 3.5–5.1)
RBC # BLD AUTO: 4.8 MILL/UL (ref 4.7–6.1)
SODIUM SERPL-SCNC: 138 MMOL/L (ref 136–145)
TROPONIN I SERPL DL<=0.01 NG/ML-MCNC: 0.1 NG/ML (ref ?–0.03)
TROPONIN I SERPL DL<=0.01 NG/ML-MCNC: 0.11 NG/ML (ref ?–0.03)
WBC # BLD AUTO: 6.1 THOU/UL (ref 4.8–10.8)

## 2020-05-14 PROCEDURE — 80048 BASIC METABOLIC PNL TOTAL CA: CPT

## 2020-05-14 PROCEDURE — 82553 CREATINE MB FRACTION: CPT

## 2020-05-14 PROCEDURE — S0028 INJECTION, FAMOTIDINE, 20 MG: HCPCS

## 2020-05-14 PROCEDURE — C1898 LEAD, PMKR, OTHER THAN TRANS: HCPCS

## 2020-05-14 PROCEDURE — 82550 ASSAY OF CK (CPK): CPT

## 2020-05-14 PROCEDURE — 36415 COLL VENOUS BLD VENIPUNCTURE: CPT

## 2020-05-14 PROCEDURE — 33208 INSRT HEART PM ATRIAL & VENT: CPT

## 2020-05-14 PROCEDURE — 84443 ASSAY THYROID STIM HORMONE: CPT

## 2020-05-14 PROCEDURE — C1785 PMKR, DUAL, RATE-RESP: HCPCS

## 2020-05-14 PROCEDURE — 84484 ASSAY OF TROPONIN QUANT: CPT

## 2020-05-14 PROCEDURE — 71046 X-RAY EXAM CHEST 2 VIEWS: CPT

## 2020-05-14 PROCEDURE — 93017 CV STRESS TEST TRACING ONLY: CPT

## 2020-05-14 PROCEDURE — 76942 ECHO GUIDE FOR BIOPSY: CPT

## 2020-05-14 PROCEDURE — 75820 VEIN X-RAY ARM/LEG: CPT

## 2020-05-14 PROCEDURE — 36005 INJECTION EXT VENOGRAPHY: CPT

## 2020-05-14 PROCEDURE — A9500 TC99M SESTAMIBI: HCPCS

## 2020-05-14 PROCEDURE — 86803 HEPATITIS C AB TEST: CPT

## 2020-05-14 PROCEDURE — 71045 X-RAY EXAM CHEST 1 VIEW: CPT

## 2020-05-14 PROCEDURE — 78452 HT MUSCLE IMAGE SPECT MULT: CPT

## 2020-05-14 PROCEDURE — 87633 RESP VIRUS 12-25 TARGETS: CPT

## 2020-05-14 PROCEDURE — 93005 ELECTROCARDIOGRAM TRACING: CPT

## 2020-05-14 PROCEDURE — 80061 LIPID PANEL: CPT

## 2020-05-14 PROCEDURE — 93306 TTE W/DOPPLER COMPLETE: CPT

## 2020-05-14 PROCEDURE — 80053 COMPREHEN METABOLIC PANEL: CPT

## 2020-05-14 PROCEDURE — 85025 COMPLETE CBC W/AUTO DIFF WBC: CPT

## 2020-05-14 PROCEDURE — 83735 ASSAY OF MAGNESIUM: CPT

## 2020-05-14 NOTE — PDOC.HHP
Hospitalist HPI





- History of Present Illness


chest pain


History of Present Illness: 





49M w/ MHx of chronic bronchitis, MAGGIE (on CPAP, adherent), and morbid obesity 

presents with chest pain. Has had dry cough for months, but in past month or so 

increased in frequency, mostly in work environment. Starting Sunday, 

progressively worse, left sided stabbing chest pain surrounding the medial part 

of his breath that was unchanged when he coughs. Not related to exertion. For 

the pain, he tried tylenol, which helped. Also endorses increased fatigue over 

the pat few days. Works with cattle and exerts himself, sometimes to the point 

of shortness of breath.





On encounter, lying comfortably in bed and endorses improvement in pain since 

arrived to the ED. denies fever, chills, chest pain, pleuritic pain, dyspnea, 

sputum production, abdomianl pain, diarrhea, dysuria, recent sick contacts or 

travel


ED Course: 





In the ED, found to have intdeterminate trop, EKG showed nonspecific conduction 

delay but no signs of ischemia. Was admitted to telemetry for further workup





Hospitalist ROS





- Review of Systems


All other systems reviewed; all pertinent +/- noted in HPI/Subj





Hospitalist History





- Past Medical History


Source: patient


Cardiac: reports: no pertinent history


Pulmonary: reports: bronchitis


CNS: reports: no pertinent history


Gastrointestinal: reports: no pertinent history


Heme/Onc: reports: no pertinent history


Hepatobiliary: reports: no pertinent history


Psych: reports: no pertinent history





- Past Surgical History


Past Surgical History: reports: no pertinent history





- Family History


Other Family History: 





obesity





- Social History


Smoking Status: Never smoker


Alcohol: reports: Rare


Drugs: reports: none


Living Situation: Alone


Activity level: independent ambulation





- Exam


General Appearance: NAD, awake alert


General - other findings: morbidly obese


Heart: RRR, no murmur, no gallops, no rubs, normal peripheral pulses


Respiratory: no wheezes, no rales, no ronchi, normal chest expansion, no 

tachypnea, normal percussion


Respiratory - other findings: diffusely reduced breath sounds, likely due to 

body habitus


Extremities: no edema


Psychiatric: normal affect, normal behavior, A&O x 3





Hospitalist Results





- Labs


Result Diagrams: 


 05/14/20 12:22





 05/14/20 12:22


Lab results: 


 











WBC  6.1 thou/uL (4.8-10.8)   05/14/20  12:22    


 


Hgb  15.1 g/dL (14.0-18.0)   05/14/20  12:22    


 


Hct  46.3 % (42.0-52.0)   05/14/20  12:22    


 


MCV  96.4 fL (78.0-98.0)   05/14/20  12:22    


 


Plt Count  140 thou/uL (130-400)   05/14/20  12:22    


 


Neutrophils %  65.1 % (42.0-75.0)   05/14/20  12:22    


 


Sodium  138 mmol/L (136-145)   05/14/20  12:22    


 


Potassium  4.1 mmol/L (3.5-5.1)   05/14/20  12:22    


 


Chloride  106 mmol/L ()   05/14/20  12:22    


 


Carbon Dioxide  23 mmol/L (22-29)   05/14/20  12:22    


 


BUN  23 mg/dL (8.9-20.6)  H  05/14/20  12:22    


 


Creatinine  0.79 mg/dL (0.7-1.3)   05/14/20  12:22    


 


Glucose  89 mg/dL ()   05/14/20  12:22    


 


Calcium  9.3 mg/dL (7.8-10.44)   05/14/20  12:22    


 


Total Bilirubin  0.4 mg/dL (0.2-1.2)   05/14/20  12:22    


 


AST  24 U/L (5-34)   05/14/20  12:22    


 


ALT  30 U/L (8-55)   05/14/20  12:22    


 


Alkaline Phosphatase  61 U/L ()   05/14/20  12:22    


 


Creatine Kinase  243 U/L ()  H  05/14/20  12:22    


 


CK-MB (CK-2)  5.6 ng/mL (0-6.6)   05/14/20  12:22    


 


Troponin I  0.105 ng/mL (< 0.028)  H  05/14/20  16:26    


 


Serum Total Protein  7.2 g/dL (6.0-8.3)   05/14/20  12:22    


 


Albumin  4.3 g/dL (3.5-5.0)   05/14/20  12:22    














- EKG Interpretation


EKG: 





as per HPI





- Radiology Interpretation


  ** Chest x-ray


Status: image reviewed by me





Hospitalist H&P A/P





- Problem


(1) Acute exacerbation of chronic bronchitis


Code(s): J20.9 - ACUTE BRONCHITIS, UNSPECIFIED; J42 - UNSPECIFIED CHRONIC 

BRONCHITIS   Status: Acute   





(2) Morbid obesity


Code(s): E66.01 - MORBID (SEVERE) OBESITY DUE TO EXCESS CALORIES   Status: 

Chronic   





(3) MAGGIE (obstructive sleep apnea)


Code(s): G47.33 - OBSTRUCTIVE SLEEP APNEA (ADULT) (PEDIATRIC)   Status: Chronic

   





- Plan


Plan: 





#acute exacerbation of chronic bronchitis


-endorses having cough for at least 3 months in each of the last two years, 

worse last month, mostly at work


-likely URI vs. allergen


-Chest pain noncardiac in nature; troponinemia appears to be chronic likely due 

to cor pulmonale


-stress test in 2018 negative; pretest CAD consortium score low





-acetaminophen PRN chest pain


-viral panel


-keep oxygen > 88%





#MAGGIE


-uses home CPAP when sleeps





#morbid obesity


-endorses bad eating habits, especially during COVID period





-will benefit from outpatient dietician and bariatric surgery consultation 

considering BMI





Dispo/PPx


full code


DVT ppx lovenox


GI PPx no indication





ELOS 1 midnight

## 2020-05-14 NOTE — RAD
TWO VIEW CHEST:

5/14/20

 

PA and lateral views obtained.

 

INDICATIONS:

Cough. 

 

COMPARISON: 

10/11/14. 

 

There is mild cardiomegaly which was present on the prior exam and appears stable. The lungs appear c
lear. No infiltrate or vascular congestion. No effusion. Osseous structures unremarkable with mild de
generative changes in the spine. 

 

IMPRESSION: 

Cardiomegaly again noted. No acute lung process. 

 

POS: AGW

## 2020-05-15 LAB
ANION GAP SERPL CALC-SCNC: 13 MMOL/L (ref 10–20)
BUN SERPL-MCNC: 19 MG/DL (ref 8.9–20.6)
CALCIUM SERPL-MCNC: 8.9 MG/DL (ref 7.8–10.44)
CHLORIDE SERPL-SCNC: 105 MMOL/L (ref 98–107)
CO2 SERPL-SCNC: 26 MMOL/L (ref 22–29)
CREAT CL PREDICTED SERPL C-G-VRATE: 234 ML/MIN (ref 70–130)
GLUCOSE SERPL-MCNC: 87 MG/DL (ref 70–105)
POTASSIUM SERPL-SCNC: 3.8 MMOL/L (ref 3.5–5.1)
SODIUM SERPL-SCNC: 140 MMOL/L (ref 136–145)

## 2020-05-15 NOTE — PDOC.HOSPP
- Subjective


Encounter Date: 05/15/20


Encounter Time: 09:00


Subjective: 





overnight, multiple short pauses on telemetry between 1-3 seconds each. 

Continues to complain of increased fatigue. Chest pain improves with tylenol





- Objective


Vital Signs & Weight: 


 Vital Signs (12 hours)











  Temp Pulse Resp BP BP Pulse Ox


 


 05/15/20 19:40  98.3 F  56 L  20   146/73 H  95


 


 05/15/20 15:41  97.9 F  50 L  16  128/70   95


 


 05/15/20 11:02  98.0 F  50 L  18  126/64   96








 Weight











Weight                         371 lb 7 oz














I&O: 


 











 05/14/20 05/15/20 05/16/20





 06:59 06:59 06:59


 


Intake Total  720 700


 


Output Total   700


 


Balance  720 0











Result Diagrams: 


 05/14/20 12:22





 05/15/20 04:03





Hospitalist ROS





- Review of Systems


Constitutional: denies: fever, chills, sweats, weakness, malaise, other


Respiratory: denies: cough, dry, shortness of breath, hemoptysis, SOB with 

excertion, pleuritic pain, sputum, wheezing, other


Cardiovascular: reports: chest pain.  denies: palpitations, orthopnea, 

paroxysmal noc. dyspnea


Gastrointestinal: denies: nausea, vomiting, abdominal pain, diarrhea, 

constipation, melena, hematochezia, other


Genitourinary: denies: dysuria, frequency, incontinence, hematuria, retention, 

other





- Medication


Medications: 


Active Medications











Generic Name Dose Route Start Last Admin





  Trade Name Freq  PRN Reason Stop Dose Admin


 


Acetaminophen  1,000 mg  05/14/20 17:21  05/14/20 20:05





  Tylenol  PO   1,000 mg





  Q6H PRN   Administration





  Moderate to Severe Pain (6-10)   





     





     





     


 


Enoxaparin Sodium  40 mg  05/15/20 09:00  05/15/20 15:53





  Lovenox  SC   40 mg





  0900 JABIER   Administration





     





     





     





     


 


Fluticasone Propionate  0 gm  05/15/20 09:00  05/15/20 09:33





  Flonase Nasal [place]  NASAL   2 spr





  DAILY JABIER   Administration





     





     





     





     


 


Ibuprofen  800 mg  05/15/20 17:00  05/15/20 16:58





  Motrin  PO   800 mg





  TID- JABIER   Administration





     





     





     





     














- Exam


General Appearance: NAD, awake alert


Neck: no JVD


Heart: RRR, no murmur, no gallops, no rubs, normal peripheral pulses


Respiratory: CTAB, no wheezes, no rales, no ronchi, normal chest expansion, no 

tachypnea, normal percussion


Gastrointestinal: soft, non-tender, non-distended, normal bowel sounds


Extremities: no edema


Psychiatric: normal affect, normal behavior, A&O x 3





Hosp A/P


(1) Acute exacerbation of chronic bronchitis


Code(s): J20.9 - ACUTE BRONCHITIS, UNSPECIFIED; J42 - UNSPECIFIED CHRONIC 

BRONCHITIS   Status: Acute   





(2) Morbid obesity


Code(s): E66.01 - MORBID (SEVERE) OBESITY DUE TO EXCESS CALORIES   Status: 

Chronic   





(3) MAGGIE (obstructive sleep apnea)


Code(s): G47.33 - OBSTRUCTIVE SLEEP APNEA (ADULT) (PEDIATRIC)   Status: Chronic

   





- Plan


#Multiple pauses on telemetry


-while sleeping


-pending stress test





#chest pain


-atypical, improved with tylenol





-changed to ibuprofen for better antiinflammatory properties compared to tylenol

## 2020-05-15 NOTE — CON
DATE OF CONSULTATION:  



HISTORY OF PRESENT ILLNESS:  This patient is a pleasant 49-year-old gentleman 
who presents with recurrent chest discomfort.  The patient was seen initially 
in 2015

with chest discomfort.  He subsequently underwent cardiac catheterization, 
which revealed normal coronary arteries.  The patient presented once again in 
2018 with

atypical chest pain.  He underwent a Cardiolite stress test, which revealed no 
evidence of ischemia.  The patient states he was in his usual state of health 
until

this Sunday.  He once again developed left-sided chest discomfort.  This has 
been a persistent discomfort for the last 5 days.  The discomfort seems to be 
worse when he

takes a deep breath.  The patient also reports noticing increasing fatigue.  
The patient denies having any history of lightheadedness or syncope. 



PAST MEDICAL HISTORY: 

1. Sleep apnea.

2. Dyslipidemia.

3. Morbid obesity.



PAST SURGICAL HISTORY:  

1. Chest surgery.

2. Wrist surgery.



SOCIAL HISTORY:  Nonsmoker.



ALLERGIES:   NO KNOWN DRUG ALLERGIES.



MEDICATIONS:  see nursing list.



FAMILY HISTORY:  Strong family history with a brother had coronary artery bypass

graft surgery. 



REVIEW OF SYSTEMS:  Ten-point system otherwise unremarkable.



PHYSICAL EXAMINATION:

GENERAL:  Obese gentleman, in no acute distress. 

VITAL SIGNS:  Blood pressure 126/64. 

NECK:  No jugular venous distention. 

LUNGS:  Clear to auscultation. 

HEART:  Regular rate and rhythm.  Normal S1 and S2.  No murmurs. 

ABDOMEN:  Distended. 

EXTREMITIES:  No edema. 

VASCULAR:  Radial pulses are 2+.



LABORATORY RESULTS:  Sodium is 140, potassium 3.8, chloride 105, bicarb 26, BUN 
19,

and creatinine 0.92.  Troponin was 0.1.  White blood cell count 6.1, hemoglobin

15.1, hematocrit 46.3, and his platelets are 140.   EKG revealed normal sinus

rhythm, nonspecific intraventricular conduction delay, nonspecific T-wave

abnormality.  Telemetry monitoring  sinus bradycardia with pauses up to 2.4

seconds. 



IMPRESSION:  

1. Chest pain, atypical, possibly due to pleurisy.

2. Bradycardia, probably secondary to sleep apnea.

3. Sleep apnea.

4. Morbid obesity.

5. Family history of coronary artery disease. 



This gentleman presents with atypical chest pain.  This has been persistent for 
the past 5 days.  His troponin level is indeterminate.  Because of his strong 
family

history, we would recommend repeat stress testing to make sure there is no 
evidence of significant ischemia.  The patient will be treated with Toradol for 
possible

pleurisy.  From a cardiac standpoint, he has significant bradycardia, but is 
asymptomatic and this is most likely due to his underlying bradycardia. 



PLAN:  

1. Treat with IV Toradol and nonsteroidal medication.

2. Repeat stress testing to make sure there is no evidence of significant 
ischemia.







Job ID:  [id number]



MTDD

## 2020-05-16 LAB
HEP C INDEX: 0.08 S/CO (ref 0–0.79)
TSH SERPL DL<=0.005 MIU/L-ACNC: 1.63 UIU/ML (ref 0.35–4.94)

## 2020-05-16 NOTE — EKG
Test Reason : 

Blood Pressure : ***/*** mmHG

Vent. Rate : 055 BPM     Atrial Rate : 055 BPM

   P-R Int : 182 ms          QRS Dur : 120 ms

    QT Int : 468 ms       P-R-T Axes : 037 006 013 degrees

   QTc Int : 447 ms

 

Sinus bradycardia

Non-specific intra-ventricular conduction delay

Nonspecific T wave abnormality

Abnormal ECG

When compared with ECG of 28-FEB-2019 13:15,

No significant change was found

 

Triage

Confirmed by AIMEE MEJIA, JUSTIN MOSQUEDA ([id number]),  PAUL RUTHERFORD ([id number]) on 5/16/2020 3:08:08 PM

 

Referred By:             Confirmed By:JUSTIN YU MD

## 2020-05-16 NOTE — NM
NUCLEAR MEDICINE

CARDIAC MYOCARDIAL PERFUSION SPECT

EJECTION FRACTION STUDY

WALL MOTION CINE:



DATE:

5/16/2020



HISTORY:

49 year old male with dyslipidemia and family history of coronary artery disease presents with chest 
pain and elevated troponin



TECHNIQUE:

Number of days: 2

Rest study: Technetium 99m-sestamibi (Cardiolite) dose: 30.6 mCi

Pharmacologic stress: Lexiscan dose: 0.4 mg

Stress study: Technetium 99m-sestamibi (Cardiolite) dose: 31.7 mCi



FINDINGS:



CARDIAC (MYOCARDIAL PERFUSION) SPECT

Images are somewhat degraded by motion.

There is a small anterior wall apparently fixed defect.

There are no reversible myocardial perfusion defects.



EJECTION FRACTION STUDY

Left ventricular EF = 46 %



WALL MOTION CINE

Hypokinesis of the posterior aspect of the septum



IMPRESSION:



1. No evidence of reversible ischemia.

2. Decreased left ventricular ejection fraction of 46%.

3. Possible anterior wall small old infarction.



Reported By: Emre Mullen 

Electronically Signed:  5/16/2020 1:09 PM

## 2020-05-16 NOTE — PDOC.HOSPP
- Subjective


Encounter Date: 05/16/20


Encounter Time: 13:00


Subjective: 





no overnight events. s/p stress test pending final report





- Objective


Vital Signs & Weight: 


 Vital Signs (12 hours)











  Temp Pulse Resp BP Pulse Ox


 


 05/16/20 15:58  98.0 F  53 L  16  136/76  97


 


 05/16/20 12:34  98.0 F  52 L  14  127/68  95


 


 05/16/20 10:48      95








 Weight











Weight                         375 lb 3.2 oz














I&O: 


 











 05/15/20 05/16/20 05/17/20





 06:59 06:59 06:59


 


Intake Total 


 


Output Total  1250 1575


 


Balance 045 -480 -529











Result Diagrams: 


 05/14/20 12:22





 05/15/20 04:03





Hospitalist ROS





- Review of Systems


Constitutional: denies: fever, chills, sweats, weakness, malaise, other


Respiratory: denies: cough, dry, shortness of breath, hemoptysis, SOB with 

excertion, pleuritic pain, sputum, wheezing, other


Cardiovascular: denies: palpitations, orthopnea, paroxysmal noc. dyspnea, edema


Gastrointestinal: denies: nausea, vomiting, abdominal pain, diarrhea, 

constipation, melena, hematochezia, other


Genitourinary: denies: dysuria, frequency, incontinence, hematuria, retention, 

other





- Medication


Medications: 


Active Medications











Generic Name Dose Route Start Last Admin





  Trade Name Freq  PRN Reason Stop Dose Admin


 


Enoxaparin Sodium  40 mg  05/15/20 09:00  05/16/20 14:12





  Lovenox  SC   40 mg





  0900 JABIER   Administration





     





     





     





     


 


Fluticasone Propionate  0 gm  05/15/20 09:00  05/16/20 08:17





  Flonase Nasal [place]  NASAL   2 spr





  DAILY JABIER   Administration





     





     





     





     


 


Ibuprofen  800 mg  05/15/20 17:00  05/16/20 16:03





  Motrin  PO   800 mg





  TID-WM JABIER   Administration





     





     





     





     














- Exam


General Appearance: NAD, awake alert


Heart: RRR, no murmur, no gallops, no rubs, normal peripheral pulses


Respiratory: CTAB, no wheezes, no rales, no ronchi, normal chest expansion, no 

tachypnea, normal percussion


Gastrointestinal: soft, non-tender, non-distended, normal bowel sounds, no 

palpable masses, no hepatomegaly, no splenomegaly, no bruit





Hosp A/P


(1) Acute exacerbation of chronic bronchitis


Code(s): J20.9 - ACUTE BRONCHITIS, UNSPECIFIED; J42 - UNSPECIFIED CHRONIC 

BRONCHITIS   Status: Acute   





(2) Morbid obesity


Code(s): E66.01 - MORBID (SEVERE) OBESITY DUE TO EXCESS CALORIES   Status: 

Chronic   





(3) MAGGIE (obstructive sleep apnea)


Code(s): G47.33 - OBSTRUCTIVE SLEEP APNEA (ADULT) (PEDIATRIC)   Status: Chronic

   





- Plan


#Multiple pauses on telemetry


-while sleeping


-pending stress test report


-cardiology onboard


-asymptomatic, likely no treatment required





#chest pain


-atypical, improved with tylenol





-changed to ibuprofen for better antiinflammatory properties compared to tylenol

## 2020-05-17 RX ADMIN — ASPIRIN SCH MG: 81 TABLET ORAL at 09:21

## 2020-05-17 NOTE — PDOC.HOSPP
- Subjective


Encounter Date: 05/17/20


Encounter Time: 09:00


Subjective: 





no overnight events. This morning, feels well and has no complaints. Endorses 

reduction in cough in hospital environment as compared to work. 





- Objective


Vital Signs & Weight: 


 Vital Signs (12 hours)











  Temp Pulse Resp BP BP BP Pulse Ox


 


 05/17/20 11:57  98.4 F  52 L  16   128/67   94 L


 


 05/17/20 07:50        95


 


 05/17/20 07:33  98.3 F  51 L  18  144/82 H    95


 


 05/17/20 03:27  97.6 F  53 L  18    120/64  96








 Weight











Weight                         377 lb 14.4 oz














I&O: 


 











 05/16/20 05/17/20 05/18/20





 06:59 06:59 06:59


 


Intake Total 940 1475 


 


Output Total 1250 1775 


 


Balance -310 -300 











Result Diagrams: 


 05/14/20 12:22





 05/15/20 04:03





Hospitalist ROS





- Review of Systems


Constitutional: denies: fever, chills, sweats, weakness, malaise, other


Respiratory: denies: cough, dry, shortness of breath, hemoptysis, SOB with 

excertion, pleuritic pain, sputum, wheezing, other


Cardiovascular: denies: chest pain, palpitations, orthopnea, paroxysmal noc. 

dyspnea, edema, light headedness, other


Gastrointestinal: denies: nausea, vomiting, abdominal pain, diarrhea, 

constipation, melena, hematochezia, other


Genitourinary: denies: dysuria, frequency, incontinence, hematuria, retention, 

other





- Medication


Medications: 


Active Medications











Generic Name Dose Route Start Last Admin





  Trade Name [place]  PRN Reason Stop Dose Admin


 


Aspirin  81 mg  05/17/20 09:00  05/17/20 09:21





  Ecotrin  PO   81 mg





  DAILY JABIER   Administration





     





     





     





     


 


Enoxaparin Sodium  40 mg  05/15/20 09:00  05/17/20 09:18





  Lovenox  SC   40 mg





  0900 JABIER   Administration





     





     





     





     


 


Fluticasone Propionate  0 gm  05/15/20 09:00  05/17/20 09:18





  Flonase Nasal [place]  NASAL   2 spr





  DAILY JABIER   Administration





     





     





     





     


 


Ibuprofen  800 mg  05/15/20 17:00  05/17/20 12:02





  Motrin  PO   800 mg





  TID- JABIER   Administration





     





     





     





     


 


Lisinopril  10 mg  05/17/20 09:00  05/17/20 09:44





  Zestril  PO   10 mg





  DAILY JABIER   Administration





     





     





     





     














- Exam


General Appearance: NAD, awake alert


Heart: RRR, no murmur, no gallops, no rubs, normal peripheral pulses


Respiratory: CTAB, no wheezes, no rales, no ronchi, normal chest expansion, no 

tachypnea, normal percussion


Gastrointestinal: soft, non-tender, non-distended, normal bowel sounds, no 

palpable masses, no hepatomegaly, no splenomegaly, no bruit


Extremities: no edema


Psychiatric: normal affect, normal behavior, A&O x 3





Hosp A/P


(1) Morbid obesity


Code(s): E66.01 - MORBID (SEVERE) OBESITY DUE TO EXCESS CALORIES   Status: 

Chronic   





(2) MAGGIE (obstructive sleep apnea)


Code(s): G47.33 - OBSTRUCTIVE SLEEP APNEA (ADULT) (PEDIATRIC)   Status: Chronic

   





(3) Allergic rhinitis with postnasal drip


Code(s): J30.9 - ALLERGIC RHINITIS, UNSPECIFIED; R09.82 - POSTNASAL DRIP   

Status: Acute   





- Plan


#chest pain


-nuclear stress showing small focal deficit that is old; EF 46% (reduced 

systolic function)





-per cardiology, NPO midnight for possible cath tomorrow





#SA shaye pause


-while sleeping


-pending stress test report


-cardiology onboard


-asymptomatic; on telemetry pause interval not complete multiples regular R-R; 

likely sinus shaye pause; likely no treatment required; deferring to cardiology





#Allergic rhinitis with postnasal drip resuling in cough


-patient endorses symptoms mostly at work, improvement in hospital, having had 

allergen testing that was positive for plants; has no sputum production, 

inconsistent with bronchitis





-continue flonase


-educate patient regarding avoidance and air purifiers





ELOS: 1-2 midnights

## 2020-05-18 LAB
CHD RISK SERPL-RTO: 3.8 (ref ?–4.5)
CHOLEST SERPL-MCNC: 165 MG/DL
HDLC SERPL-MCNC: 44 MG/DL
LDLC SERPL CALC-MCNC: 101 MG/DL
TRIGL SERPL-MCNC: 101 MG/DL (ref ?–150)

## 2020-05-18 RX ADMIN — ASPIRIN SCH MG: 81 TABLET ORAL at 08:28

## 2020-05-18 NOTE — PDOC.HOSPP
- Subjective


Encounter Date: 05/18/20


Encounter Time: 08:00


Subjective: 





overnight, had additional pauses including this morning when the patient was 

awake, which is a change compared to previous days. Continues to be bradycardic 

and complain of fatigue. has no other complaints. 





- Objective


Vital Signs & Weight: 


 Vital Signs (12 hours)











  Temp Pulse Resp BP Pulse Ox


 


 05/18/20 15:51  98.1 F  51 L  18  134/65  93 L


 


 05/18/20 11:20  97.7 F  47 L  18  117/56 L  94 L








 Weight











Weight                         375 lb 14.4 oz














I&O: 


 











 05/17/20 05/18/20 05/19/20





 06:59 06:59 06:59


 


Intake Total 1475 1920 960


 


Output Total 1775 1200 850


 


Balance -300 720 110











Result Diagrams: 


 05/14/20 12:22





 05/15/20 04:03





Hospitalist ROS





- Review of Systems


Constitutional: denies: fever, chills, sweats, weakness, malaise, other


Respiratory: denies: cough, dry, shortness of breath, hemoptysis, SOB with 

excertion, pleuritic pain, sputum, wheezing, other


Cardiovascular: denies: chest pain, palpitations, orthopnea, paroxysmal noc. 

dyspnea, edema, light headedness, other


Gastrointestinal: denies: nausea, vomiting, abdominal pain, diarrhea, 

constipation, melena, hematochezia, other





- Medication


Medications: 


Active Medications











Generic Name Dose Route Start Last Admin





  Trade Name Freq  PRN Reason Stop Dose Admin


 


Aspirin  81 mg  05/17/20 09:00  05/18/20 08:28





  Ecotrin  PO   81 mg





  DAILY JABIER   Administration





     





     





     





     


 


Atorvastatin Calcium  40 mg  05/17/20 21:00  05/17/20 20:23





  Lipitor  PO   40 mg





  HS JABIER   Administration





     





     





     





     


 


Enoxaparin Sodium  40 mg  05/15/20 09:00  05/18/20 12:48





  Lovenox  SC   40 mg





  0900 JABIER   Administration





     





     





     





     


 


Fluticasone Propionate  0 gm  05/15/20 09:00  05/18/20 08:29





  Flonase Nasal [place]  NASAL   2 spr





  DAILY JABIER   Administration





     





     





     





     


 


Ibuprofen  800 mg  05/15/20 17:00  05/18/20 17:42





  Motrin  PO   800 mg





  TID-WM JABIER   Administration





     





     





     





     


 


Lisinopril  10 mg  05/17/20 09:00  05/18/20 08:29





  Zestril  PO   10 mg





  DAILY JABIER   Administration





     





     





     





     














- Exam


General Appearance: NAD, awake alert


Heart: no murmur, no gallops, no rubs


Heart - other findings: bradycardic. additional sinus pause episodes on 

telemetry


Gastrointestinal: soft, non-tender, non-distended, normal bowel sounds


Extremities: no edema


Psychiatric: normal affect, normal behavior, A&O x 3





Hosp A/P


(1) Symptomatic bradycardia


Code(s): R00.1 - BRADYCARDIA, UNSPECIFIED   Status: Acute   





(2) Sinus pause


Code(s): I45.5 - OTHER SPECIFIED HEART BLOCK   Status: Acute   





(3) Morbid obesity


Code(s): E66.01 - MORBID (SEVERE) OBESITY DUE TO EXCESS CALORIES   Status: 

Chronic   





(4) MAGGIE (obstructive sleep apnea)


Code(s): G47.33 - OBSTRUCTIVE SLEEP APNEA (ADULT) (PEDIATRIC)   Status: Chronic

   





(5) Allergic rhinitis with postnasal drip


Code(s): J30.9 - ALLERGIC RHINITIS, UNSPECIFIED; R09.82 - POSTNASAL DRIP   

Status: Acute   





- Plan


#symptomatic bradycardia


#sinus pause


-per cardiology, bradycardia and pauses related to fatigue





-consult EP to assess pacemaker placement





#Reduced systolic function


-nuclear stress test showing EF 46%, local hypokinesis; euvolemic





-continue current regimen





#Allergic rhinitis with postnasal drip resuling in cough


-patient endorses symptoms mostly at work, improvement in hospital, having had 

allergen testing that was positive for plants; has no sputum production, 

inconsistent with bronchitis





-continue flonase


-educate patient regarding avoidance and air purifiers





Disposition: considering symptomatic despite previous interventions, 

bradycardia may be etiology and may require procedure. Therefore, transitioning 

to inpatient status


ELOS: 1-2 midnights

## 2020-05-18 NOTE — PDOC.CPN
- Subjective


Date: 05/18/20


Time: 17:12


Interval history: 


No new issues. Talking to him he states he has felt tiored and fatigued for the 

last 3 weeks. approximately. His HR has been in the low 40's and has had 

several sinus pauses at 2.5 secs. 





- Review of Systems


General: reports: fatigue.  denies: fever/chills, weight/appetite/sleep changes

, night sweats


Respiratory: denies: cough, congestion, shortness of breath, exercise 

intolerance


Cardiovascular: denies: chest pain, palpitation, edema, paroxysmal nocturnal 

dyspnea, orthopnea


Gastrointestinal: denies: nausea, vomiting, diarrhea, constipation, abd pain, 

GI bleeding


Musculoskeletal: denies: pain, tenderness, stiffness, swelling, arthritis/

arthralgias


Neurological: denies: numbness, syncope, seizure, weakness





- Objective


Allergies/Adverse Reactions: 


 Allergies











Allergy/AdvReac Type Severity Reaction Status Date / Time


 


No Known Allergies Allergy   Verified 05/14/20 15:54











Visit Medications: 


 Current Medications





Aspirin (Ecotrin)  81 mg PO DAILY [facility]


   Last Admin: 05/18/20 08:28 Dose:  81 mg


Atorvastatin Calcium (Lipitor)  40 mg PO HS [facility]


   Last Admin: 05/17/20 20:23 Dose:  40 mg


Enoxaparin Sodium (Lovenox)  40 mg SC 0900 [facility]


   Last Admin: 05/18/20 12:48 Dose:  40 mg


Fluticasone Propionate (Flonase Nasal Spr[place])  0 gm NASAL DAILY [facility]


   Last Admin: 05/18/20 08:29 Dose:  2 spr


Ibuprofen (Motrin)  800 mg PO TID-WM [facility]


   Last Admin: 05/18/20 11:24 Dose:  800 mg


Lisinopril (Zestril)  10 mg PO DAILY [facility]


   Last Admin: 05/18/20 08:29 Dose:  10 mg


Ondansetron HCl (Zofran)  4 mg IVP Q6H PRN


   PRN Reason: Nausea/Vomiting








Vital Signs & Weight: 


 Vital Signs











  Temp Pulse Resp BP BP BP Pulse Ox


 


 05/18/20 15:51  98.1 F  51 L  18    134/65  93 L


 


 05/18/20 11:20  97.7 F  47 L  18    117/56 L  94 L


 


 05/18/20 08:29     138/75   


 


 05/18/20 07:25  96.5 F L  52 L  14   130/73   95








 











Weight                         375 lb 14.4 oz

















- Physical Exam


General: alert & oriented x3


HEENT: mucus membranes moist


Neck: supple neck


Cardiac: regular rate and rhythm


Lungs: clear to auscultation


Neuro: grossly intact


Abdomen: active bowel sounds


Extremities: no edema


Skin: clear


Musculoskeletal: no pain





- Labs


Result Diagrams: 


 05/14/20 12:22





 05/15/20 04:03


 Troponin/CKMB











CK-MB (CK-2)  5.6 ng/mL (0-6.6)   05/14/20  12:22    


 


Troponin I  0.108 ng/mL (< 0.028)  H  05/14/20  19:24    














- Telemetry


Sinus rhythms and dysrhythmias: sinus bradycardia (< 50 bpm)


Supraventricular conduction: other (2.5 seconds pauses, several times. HR at 40.

)





- Assessment/Plan


Assessment/Plan: 





1. Chest araseli, atypical.


2. Normal coronaries in 2015


3. besity


4. OSAusing CPAP.


5. Symptyomatic bradycardia.








PLAN:


- Highly suspicious of AV node dysfunction.


- May need a PPM. 


- Will consult EP for their opinion on possibly needing PPM. I would think he 

will benefit as he is symptomatic and bradycardic.

## 2020-05-18 NOTE — CON
DATE OF CONSULTATION:  05/18/2020



REASON FOR CONSULTATION:  Bradycardia and pacemaker consideration.



HISTORY OF PRESENT ILLNESS:  Mr. Jimenez is a 49-year-old gentleman who presented

with recurrent chest discomfort.  He had undergone initial cardiac evaluation for

similar issue in 2015, at which point a left heart catheterization was performed

which revealed known coronary artery disease.  In 2018, he had a repeat episode of

atypical chest pain prompting a Cardiolite stress test which was not suggestive of

ischemia.  Mr. Jimenez reports he was in his usual state of health, feeling well,

and began to have this left-sided chest discomfort once again that persisted for 5

days and he also had pronounced fatigue.  He denies any heart racing, palpitations,

syncope, near syncope, stroke, or stroke-like symptoms. 



REVIEW OF SYSTEMS:  As per HPI.  Otherwise, 12-point review of systems is negative.



PAST MEDICAL HISTORY:  

1. Sleep apnea.

2. Dyslipidemia.

3. Morbid obesity.



SOCIAL HISTORY:  Nonsmoker.  Denies alcohol or illicit drug use.



ALLERGIES:  NO KNOWN DRUG ALLERGIES.



MEDICATIONS:  Home medication of aspirin.



FAMILY HISTORY:  Positive for coronary artery disease with multiple family members.



OBJECTIVE:  VITAL SIGNS:  97.7, pulse 42, respirations 18, oxygen is 94% on room

air, blood pressure 117/56. 

GENERAL:  The patient is alert and oriented.  Speech is clear.  Affect is

appropriate.  He is morbidly obese.  He is in no apparent distress at the time of

the exam. 

NECK:  Large, supple, nontender.  Difficult to assess for JVD.  Trachea is midline. 

HEART:  Heart rate is slow, crisp S1, S2.  PMI is minimally palpable due to habitus. 

LUNGS:  Clear to auscultation.  Diminished in the bases.  Respirations are even and

unlabored. 

EXTREMITIES:  Warm and dry to touch.  He is without clubbing, cyanosis, or edema. 

NEUROLOGIC:  Grossly intact.  Nonfocal.



DATABASE:  Telemetry and EKG show sinus bradycardia in the 40s. 



Echocardiogram, 04/15/2020, ejection fraction 50% to 55%. 



05/15/2020, stress test performed, not suggestive for ischemia.



IMPRESSION:  

1. Symptomatic bradycardia while awake with associated fatigue.

2. Structurally normal heart with a recent negative stress test and a preserved

ejection fraction of 50% to 55%. 

3. Atypical chest pain.

4. Morbid obesity.

5. Obstructive sleep apnea, treated with CPAP.



PLAN AND RECOMMENDATIONS:  Mr. Jimenez is experiencing symptomatic bradycardia and

sinus node dysfunction.  He is not on any medications that would be exacerbating

this condition or causing his bradycardia.  He has clearly associated fatigue and

symptoms with his bradycardia.  His sleep apnea is appropriately treated with CPAP.

With this in mind, my recommendation would be for permanent dual-chamber pacemaker

implant.  We discussed the risks, benefits, and alternatives including pain,

bruising, swelling, infection at the pacemaker implant site as well as device

malfunction, lead dislodgement, and possible need for replacement.  The patient

voices understanding.  We will keep him n.p.o. after midnight and begin scheduling

arrangements. 



Thank you for allowing me to participate in the care of this patient.







Job ID:  [id number]

## 2020-05-19 LAB
ANION GAP SERPL CALC-SCNC: 10 MMOL/L (ref 10–20)
BASOPHILS # BLD AUTO: 0 THOU/UL (ref 0–0.2)
BASOPHILS NFR BLD AUTO: 0.7 % (ref 0–1)
BUN SERPL-MCNC: 17 MG/DL (ref 8.9–20.6)
CALCIUM SERPL-MCNC: 8.7 MG/DL (ref 7.8–10.44)
CHLORIDE SERPL-SCNC: 105 MMOL/L (ref 98–107)
CO2 SERPL-SCNC: 28 MMOL/L (ref 22–29)
CREAT CL PREDICTED SERPL C-G-VRATE: 245 ML/MIN (ref 70–130)
EOSINOPHIL # BLD AUTO: 0.2 THOU/UL (ref 0–0.7)
EOSINOPHIL NFR BLD AUTO: 4 % (ref 0–10)
GLUCOSE SERPL-MCNC: 91 MG/DL (ref 70–105)
HGB BLD-MCNC: 13.7 G/DL (ref 14–18)
LYMPHOCYTES # BLD: 1.6 THOU/UL (ref 1.2–3.4)
LYMPHOCYTES NFR BLD AUTO: 26.2 % (ref 21–51)
MCH RBC QN AUTO: 32.6 PG (ref 27–31)
MCV RBC AUTO: 98.4 FL (ref 78–98)
MONOCYTES # BLD AUTO: 0.6 THOU/UL (ref 0.11–0.59)
MONOCYTES NFR BLD AUTO: 9.6 % (ref 0–10)
NEUTROPHILS # BLD AUTO: 3.5 THOU/UL (ref 1.4–6.5)
NEUTROPHILS NFR BLD AUTO: 59.5 % (ref 42–75)
PLATELET # BLD AUTO: 115 THOU/UL (ref 130–400)
POTASSIUM SERPL-SCNC: 4.1 MMOL/L (ref 3.5–5.1)
RBC # BLD AUTO: 4.21 MILL/UL (ref 4.7–6.1)
SODIUM SERPL-SCNC: 139 MMOL/L (ref 136–145)
WBC # BLD AUTO: 5.9 THOU/UL (ref 4.8–10.8)

## 2020-05-19 PROCEDURE — 0JH606Z INSERTION OF PACEMAKER, DUAL CHAMBER INTO CHEST SUBCUTANEOUS TISSUE AND FASCIA, OPEN APPROACH: ICD-10-PCS | Performed by: INTERNAL MEDICINE

## 2020-05-19 PROCEDURE — 02HK3JZ INSERTION OF PACEMAKER LEAD INTO RIGHT VENTRICLE, PERCUTANEOUS APPROACH: ICD-10-PCS | Performed by: INTERNAL MEDICINE

## 2020-05-19 PROCEDURE — 02H63JZ INSERTION OF PACEMAKER LEAD INTO RIGHT ATRIUM, PERCUTANEOUS APPROACH: ICD-10-PCS | Performed by: INTERNAL MEDICINE

## 2020-05-19 RX ADMIN — ASPIRIN SCH MG: 81 TABLET ORAL at 07:42

## 2020-05-19 NOTE — RAD
PORTABLE CHEST:

5/19/20

 

INDICATIONS:

Pacemaker placement.

 

COMPARISON: 

5/14/20.

 

FINDINGS/IMPRESSION: 

Cardiomegaly. Soft tissue attenuation limits detail. Dural lead pacemaker has been placed via the lef
t subclavian vein and leads appear adequately positioned. There is cardiomegaly, mild vascular engorg
ement which is unchanged from prior exam. 

 

POS: [facility]

## 2020-05-19 NOTE — PDOC.HOSPP
- Subjective


Encounter Date: 05/19/20


Encounter Time: 09:00


Subjective: 





no overnight events. Bradycardic episodes more diffuse through the day and 

complains of fatigue that is not improving. Pending pacemaker placement per 

cardiology





- Objective


Vital Signs & Weight: 


 Vital Signs (12 hours)











  Temp Pulse Resp BP BP Pulse Ox


 


 05/19/20 07:43     122/72  


 


 05/19/20 07:40  98.4 F  48 L  15   122/72  96


 


 05/19/20 07:37  98.4 F  48 L  15   122/72  96








 Weight











Weight                         380 lb 14.4 oz














I&O: 


 











 05/18/20 05/19/20 05/20/20





 06:59 06:59 06:59


 


Intake Total 1920 1800 


 


Output Total 1200 850 


 


Balance 720 950 











Result Diagrams: 


 05/19/20 04:20





 05/19/20 04:20





Hospitalist ROS





- Review of Systems


Constitutional: reports: malaise.  denies: fever, chills, sweats, weakness, 

other


Respiratory: denies: cough, dry, shortness of breath, hemoptysis, SOB with 

excertion, pleuritic pain, sputum, wheezing, other


Cardiovascular: denies: chest pain, palpitations, orthopnea, paroxysmal noc. 

dyspnea, edema, light headedness, other


Gastrointestinal: denies: nausea, vomiting, abdominal pain, diarrhea, 

constipation, melena, hematochezia, other





- Medication


Medications: 


Active Medications











Generic Name Dose Route Start Last Admin





  Trade Name Freq  PRN Reason Stop Dose Admin


 


Aspirin  81 mg  05/17/20 09:00  05/19/20 07:42





  Ecotrin  PO   81 mg





  DAILY JABIER   Administration





     





     





     





     


 


Atorvastatin Calcium  40 mg  05/17/20 21:00  05/18/20 21:12





  Lipitor  PO   40 mg





  HS JABIER   Administration





     





     





     





     


 


Enoxaparin Sodium  40 mg  05/15/20 09:00  05/18/20 12:48





  Lovenox  SC   40 mg





  0900 JABIER   Administration





     





     





     





     


 


Fluticasone Propionate  0 gm  05/15/20 09:00  05/18/20 08:29





  Flonase Nasal [place]  NASAL   2 spr





  DAILY JABIER   Administration





     





     





     





     


 


Ibuprofen  800 mg  05/15/20 17:00  05/18/20 17:42





  Motrin  PO   800 mg





  TID-WM JABIER   Administration





     





     





     





     


 


Lisinopril  10 mg  05/17/20 09:00  05/19/20 07:43





  Zestril  PO   10 mg





  DAILY JABIER   Administration





     





     





     





     














- Exam


General Appearance: NAD, awake alert


Heart: no murmur, no gallops, no rubs, normal peripheral pulses


Heart - other findings: bradycardic


Respiratory: CTAB, no wheezes, no rales, no ronchi, normal chest expansion, no 

tachypnea, normal percussion


Gastrointestinal: soft, non-tender, non-distended, normal bowel sounds, no 

palpable masses


Extremities: no edema


Psychiatric: normal affect, normal behavior, A&O x 3





Hosp A/P


(1) Symptomatic bradycardia


Code(s): R00.1 - BRADYCARDIA, UNSPECIFIED   Status: Acute   





(2) Sinus pause


Code(s): I45.5 - OTHER SPECIFIED HEART BLOCK   Status: Acute   





(3) Morbid obesity


Code(s): E66.01 - MORBID (SEVERE) OBESITY DUE TO EXCESS CALORIES   Status: 

Chronic   





(4) MAGGIE (obstructive sleep apnea)


Code(s): G47.33 - OBSTRUCTIVE SLEEP APNEA (ADULT) (PEDIATRIC)   Status: Chronic

   





(5) Allergic rhinitis with postnasal drip


Code(s): J30.9 - ALLERGIC RHINITIS, UNSPECIFIED; R09.82 - POSTNASAL DRIP   

Status: Acute   





- Plan


#symptomatic bradycardia


#sinus pause


-pending pacemaker placement per cardiology





#Reduced systolic function


-nuclear stress test showing EF 46%, local hypokinesis; euvolemic





-continue current regimen





#Allergic rhinitis with postnasal drip resuling in cough


-patient endorses symptoms mostly at work, improvement in hospital, having had 

allergen testing that was positive for plants; has no sputum production, 

inconsistent with bronchitis





-continue flonase


-educate patient regarding avoidance and air purifiers





Disposition: considering symptomatic despite previous interventions, 

bradycardia may be etiology and may require procedure. Therefore, transitioning 

to inpatient status


ELOS: 1 midnights

## 2020-05-19 NOTE — PDOC.CPN
- Subjective


Date: 05/19/20


Time: 18:03


Interval history: 





He had his PPM placed. Doing well. 





- Review of Systems


General: denies: fever/chills, weight/appetite/sleep changes, night sweats, 

fatigue


Respiratory: denies: cough, congestion, shortness of breath, exercise 

intolerance


Cardiovascular: denies: chest pain, palpitation, edema, paroxysmal nocturnal 

dyspnea, orthopnea


Gastrointestinal: denies: nausea, vomiting, diarrhea, constipation, abd pain, 

GI bleeding


Musculoskeletal: denies: pain, tenderness, stiffness, swelling, arthritis/

arthralgias


Neurological: denies: numbness, syncope, seizure, weakness





- Objective


Allergies/Adverse Reactions: 


 Allergies











Allergy/AdvReac Type Severity Reaction Status Date / Time


 


No Known Allergies Allergy   Verified 05/14/20 15:54











Visit Medications: 


 Current Medications





Aspirin (Ecotrin)  81 mg PO DAILY [facility]


   Last Admin: 05/19/20 07:42 Dose:  81 mg


Atorvastatin Calcium (Lipitor)  40 mg PO HS [facility]


   Last Admin: 05/18/20 21:12 Dose:  40 mg


Enoxaparin Sodium (Lovenox)  40 mg SC 0900 [facility]


   Last Admin: 05/18/20 12:48 Dose:  40 mg


Fluticasone Propionate (Flonase Nasal Spray)  0 gm NASAL DAILY [facility]


   Last Admin: 05/18/20 08:29 Dose:  2 spr


Ibuprofen (Motrin)  800 mg PO TID-[facility]


   Last Admin: 05/18/20 17:42 Dose:  800 mg


Lisinopril (Zestril)  10 mg PO DAILY [facility]


   Last Admin: 05/19/20 07:43 Dose:  10 mg


Ondansetron HCl (Zofran)  4 mg IVP Q6H PRN


   PRN Reason: Nausea/Vomiting








Vital Signs & Weight: 


 Vital Signs











  Temp Pulse Resp BP BP Pulse Ox


 


 05/19/20 07:43     122/72  


 


 05/19/20 07:40  98.4 F  48 L  15   122/72  96


 


 05/19/20 07:37  98.4 F  48 L  15   122/72  96








 











Weight                         380 lb 14.4 oz

















- Physical Exam


General: alert & oriented x3


HEENT: mucus membranes moist


Neck: supple neck


Cardiac: regular rate and rhythm


Lungs: clear to auscultation


Neuro: grossly intact


Abdomen: active bowel sounds


Extremities: no edema


Skin: clear


Musculoskeletal: no pain





- Labs


Result Diagrams: 


 05/19/20 04:20





 05/19/20 04:20


 Troponin/CKMB











CK-MB (CK-2)  5.6 ng/mL (0-6.6)   05/14/20  12:22    


 


Troponin I  0.108 ng/mL (< 0.028)  H  05/14/20  19:24    














- Telemetry


Sinus rhythms and dysrhythmias: sinus rhythm





- Assessment/Plan


Assessment/Plan: 





1. Chest pain, atypical.


2. Normal coronaries in 2015


3. Obesity


4. MAGGIE using CPAP.


5. Symptomatic bradycardia.








PLAN:


- S/P PPM placement.  


- Doing better now. 


- May discharge in AM if he remains stable.

## 2020-05-20 VITALS — SYSTOLIC BLOOD PRESSURE: 133 MMHG | DIASTOLIC BLOOD PRESSURE: 72 MMHG | TEMPERATURE: 98.7 F

## 2020-05-20 LAB
ANION GAP SERPL CALC-SCNC: 12 MMOL/L (ref 10–20)
BUN SERPL-MCNC: 16 MG/DL (ref 8.9–20.6)
CALCIUM SERPL-MCNC: 8.5 MG/DL (ref 7.8–10.44)
CHLORIDE SERPL-SCNC: 106 MMOL/L (ref 98–107)
CO2 SERPL-SCNC: 25 MMOL/L (ref 22–29)
CREAT CL PREDICTED SERPL C-G-VRATE: 276 ML/MIN (ref 70–130)
GLUCOSE SERPL-MCNC: 91 MG/DL (ref 70–105)
MAGNESIUM SERPL-MCNC: 2.2 MG/DL (ref 1.6–2.6)
POTASSIUM SERPL-SCNC: 4 MMOL/L (ref 3.5–5.1)
SODIUM SERPL-SCNC: 139 MMOL/L (ref 136–145)

## 2020-05-20 PROCEDURE — 5A09357 ASSISTANCE WITH RESPIRATORY VENTILATION, LESS THAN 24 CONSECUTIVE HOURS, CONTINUOUS POSITIVE AIRWAY PRESSURE: ICD-10-PCS | Performed by: INTERNAL MEDICINE

## 2020-05-20 RX ADMIN — ASPIRIN SCH MG: 81 TABLET ORAL at 08:48

## 2020-05-20 NOTE — PDOC.CPN
- Subjective


Date: 05/20/20


Time: 12:30


Interval history: 





Doing well. Mild soreness around pacer site. 





- Review of Systems


General: denies: fever/chills, weight/appetite/sleep changes, night sweats, 

fatigue


Respiratory: denies: cough, congestion, shortness of breath, exercise 

intolerance


Cardiovascular: denies: chest pain, palpitation, edema, paroxysmal nocturnal 

dyspnea, orthopnea


Gastrointestinal: denies: nausea, vomiting, diarrhea, constipation, abd pain, 

GI bleeding


Musculoskeletal: reports: pain.  denies: tenderness, stiffness, swelling, 

arthritis/arthralgias


Neurological: denies: numbness, syncope, seizure, weakness





- Objective


Allergies/Adverse Reactions: 


 Allergies











Allergy/AdvReac Type Severity Reaction Status Date / Time


 


No Known Allergies Allergy   Verified 05/14/20 15:54











Visit Medications: 


 Current Medications





Aspirin (Ecotrin)  [facility]ILY [facility]


   Last Admin: 05/20/20 08:48 Dose:  81 mg


Atorvastatin Calcium (Lipitor)  40 mg PO HS [facility]


   Last Admin: 05/19/20 20:35 Dose:  40 mg


Enoxaparin Sodium (Lovenox)  40 mg SC 0900 [facility]


   Last Admin: 05/20/20 08:49 Dose:  40 mg


Fluticasone Propionate (Flonase Nasal Spray)  0 gm NASAL DAILY [facility]


   Last Admin: 05/20/20 08:50 Dose:  2 spr


Ibuprofen (Motrin)  800 mg PO TID-[facility]


   Last Admin: 05/20/20 11:28 Dose:  800 mg


Lisinopril (Zestril)  10 mg PO DAILY [facility]


   Last Admin: 05/20/20 08:48 Dose:  10 mg


Ondansetron HCl (Zofran)  4 mg IVP Q6H PRN


   PRN Reason: Nausea/Vomiting








Vital Signs & Weight: 


 Vital Signs











  Temp Pulse Resp BP BP Pulse Ox


 


 05/20/20 11:22  98.7 F  58 L  18   133/72  95


 


 05/20/20 07:26  99.2 F  59 L  18  114/67   93 L


 


 05/20/20 03:35  96.6 F L  71  14  103/52 L   97








 











Weight                         13.136 oz

















- Physical Exam


General: alert & oriented x3


HEENT: mucus membranes moist


Neck: supple neck


Cardiac: regular rate and rhythm


Lungs: normal breath sounds


Neuro: grossly intact


Abdomen: active bowel sounds


Extremities: no edema


Skin: clear


Musculoskeletal: no pain





- Labs


Result Diagrams: 


 05/19/20 04:20





 05/20/20 03:58


 Troponin/CKMB











CK-MB (CK-2)  5.6 ng/mL (0-6.6)   05/14/20  12:22    


 


Troponin I  0.108 ng/mL (< 0.028)  H  05/14/20  19:24    














- Telemetry


Sinus rhythms and dysrhythmias: sinus rhythm





- Assessment/Plan


Assessment/Plan: 





1. Chest pain, atypical.


2. Normal coronaries in 2015


3. Obesity


4. MAGGIE using CPAP.


5. Symptomatic bradycardia.








PLAN:


- S/P PPM placement.  


- Already feeling better with better sleep cycle. 


- May discharge home.


- Follow up in the office in 1 month.

## 2020-05-20 NOTE — PDOC.EP
- Subjective


Date: 05/20/20


Time: 13:06


Interval History: 





 follow-up after pacemaker implant for symptomatic bradycardia.  Patient feels 

well today he is having some mild tenderness at his implant site but reports 

his energy levels have already improved and he is back to his normal sleep 

schedule which is important to him and he reports was altered by his low heart 

rates. he feels ready to discharge home.  he denies any cardiac concerns or 

complaints today





- Review of Systems


Constitutional: denies: chills, fever, malaise, sweats, weakness, other


Respiratory: denies: cough, dry, hemoptysis, pleuritic pain, shortness of breath

, SOB with excertion, sputum, wheezing, other


Cardiology: denies: chest pain, edema, heart racing, light headedness, 

paroxysmal noc. dyspnea, orthopnea, palpitations, passing out, pleuritic pain, 

pressure, swelling, other


Gastrointestinal: denies: abdominal pain, constipation, diarrhea, hematochezia, 

melena, nausea, vomitting, other





- Objective


Allergies/Adverse Reactions: 


 Allergies











Allergy/AdvReac Type Severity Reaction Status Date / Time


 


No Known Allergies Allergy   Verified 05/14/20 15:54











 Current Medications





Aspirin (Ecotrin)  81 mg PO DAILY [facility]


   Last Admin: 05/20/20 08:48 Dose:  81 mg


Atorvastatin Calcium (Lipitor)  40 mg PO HS [facility]


   Last Admin: 05/19/20 20:35 Dose:  40 mg


Enoxaparin Sodium (Lovenox)  40 mg SC 0900 [facility]


   Last Admin: 05/20/20 08:49 Dose:  40 mg


Fluticasone Propionate (Flonase Nasal Spray)  0 gm NASAL DAILY [facility]


   Last Admin: 05/20/20 08:50 Dose:  2 spr


Ibuprofen (Motrin)  800 mg PO TID-[facility]


   Last Admin: 05/20/20 11:28 Dose:  800 mg


Lisinopril (Zestril)  10 mg PO DAILY [facility]


   Last Admin: 05/20/20 08:48 Dose:  10 mg


Ondansetron HCl (Zofran)  4 mg IVP Q6H PRN


   PRN Reason: Nausea/Vomiting








Vital Signs & Weight: 


 Vital Signs











  Temp Pulse Resp BP BP Pulse Ox


 


 05/20/20 11:22  98.7 F  58 L  18   133/72  95


 


 05/20/20 07:26  99.2 F  59 L  18  114/67   93 L


 


 05/20/20 03:35  96.6 F L  71  14  103/52 L   97








 











Weight                         13.136 oz














I/O: 


 I/O











 05/19/20 05/20/20 05/21/20





 06:59 06:59 06:59


 


Intake Total 1800 1200 


 


Output Total 850 1650 


 


Balance 950 -450 














- Physical Exam


General: alert & oriented x3, appears well, no apparent distress, speech clear, 

affect appropriate


HEENT: mucus membranes moist, normocephaly


Neck: supple neck, midline trachea, no lymphadenopathy


Cardiology: PMI nondisplaced


Lungs: clear to auscultation, no wheeze, rales, rhonchi


Neurology: cranial nerve 2-12 intact, grossly intact, no lateralizing findings


Abdomen: unremarkable, active bowel sounds, no pulsations/bruits


Extremities: dry, strong pulses, warm


Skin: device site stable w/o swelling, left sided device, swelling.  negative: 

bruising, drainage, hematoma





- Labs


Result Diagrams: 


 05/19/20 04:20





 05/20/20 03:58





- EKG Interpretation


EKG Method: Telemetry


EKG shows: Sinus rhythm (demand pacing seen)





- Device


Device: dual, pacemaker


Device Result: [employer]





- Assessment/Plan


Assessment/Plan: 





1.  symptomatic bradycardia


2.  status post dual chamber [employer] pacemaker implant 05/19/2020








 Postoperative Ancef and Keflex was ordered but was not started.  order given 

for Ancef 1 g now and oral Keflex to start as previously ordered. Keflex 500mg 

PO QID x 7 days upon discharge.  Wound check in 2 weeks and final device check 

in 3 months.  At that time if all is well we will sign out cardiology unless 

additional EP management is needed. PPM check today is stable. CXR post implant 

is stable


 


 okay for discharge.

## 2020-05-21 NOTE — DIS
DATE OF ADMISSION:  05/14/2020



DATE OF DISCHARGE:  05/20/2020



HOSPITAL COURSE:  Mr. Jimenez is a 49-year-old male with medical history of MAGGIE (on

CPAP at home) and morbid obesity, who presented with fatigue and chest pain.

Cardiology was consulted after the patient was found to have sinoatrial shaye pauses

initially while at sleep, but afterwards during inpatient stay, was found to be

present, also during his awake hours.  Per Cardiology, these pauses as well as the

patient's bradycardia were associated with this fatigue.  The patient received

pacemaker.  He tolerated the procedure well and on the day of discharge endorsed

feeling well.  He was discharged on Keflex 500 mg q.i.d. for 7 days with followup

appointments with Cardiology. 



MEDICATIONS:  New medications;

1. Aspirin.

2. Atorvastatin 40 mg at bedtime.

3. Ibuprofen 800 mg t.i.d. for 3 days p.r.n. wound pain.

4. Lisinopril.

5. Keflex.

6. Flonase nasal spray. 

Continued medication;

1. Oxymetazoline nasal spray.







Job ID:  [id number]

## 2020-05-22 NOTE — PQF
MAJOR VELÁSQUEZ FERNANDO

[id number]                                                             [id number]

[id number]                             

                                   

CLINICAL DOCUMENTATION CLARIFICATION FORM:  POST DISCHARGE



Addendum to original discharge summary date:  __________________________________
____



Late entry note date:  _________________________________________________________
__









DATE: 5/22/2020                                            ATTN: Rk Hope





Please exercise your independent, professional judgment in responding to the 
clarification form. 

Clinical indicators are provided on the bottom of this form for your review



Please check appropriate box(s):

AMI TYPE:   

   [  ] NSTEMI (MI type I)

   [  ] NSTEMI due to Demand Ischemia (AMI Type II)

                   [  ]  Demand Ischemia without MI

   [  ] STEMI (please also specify site and arterysee below)

   If STEMI, SITE:[  ] Anterior   [  ] Apical [  ] Lateral [  ] Inferior [  ] 
Posterior

         [  ] Q Wave    [  ] Septal [  ] Unable to Determine 

[  ] Acute Coronary Syndrome (ACS) without Acute MI meaning Unstable Angina

[  ] Atypical Chest pain                                        

[  ] Other______________

[  ] Other diagnosis ___________

[  ] Unable to determine



In addition, please specify:

Present on Admission (POA):  [  ] Yes             [  ] No             [  ] 
Unable to determine



CLINICAL INDICATORS - SIGNS / SYMPTOMS / LABS

Laboratory 5/14  CK-MB 5.6, Troponin 0.135; 0.105; 0.108

Vital signs 5/14  /96, Pulse 45, Rsp 18, Temp 98.5

EKG 5/14 Possible complete RBB. No ST segment elevation. ST wave inversion.

ED notes 7 5/14  Troponin indeterminately elevated

Consult p2 5/18 Atypica; chest pain

Consult p2 5/18 experiencing symptomatic bradycardia and sins node dysfunction



RISKS:

ED notes 7 5/14  high cholesterol

Consult p1 5/18  Dyslipidemia

Consult p1 5/18  Morbid obesity

Consult p2 5/18  Bradycardia

Consult p2 5/18  MAGGIE

Consult p2 5/18  Sinus node dysfunction

Consult p2 5/18  Family history of CAD



TREATMENTS:

MAR 5/14  Aspirin 40 mg oral

MAR 5/15  Lexiscan 0.5 mg oral

MAR 5/17  Lisinopril 10 mg oral

TTE 5/15

Dual pacemaker 5/19  Dr Ildefonso Robles

Cardiology Consult 5/18  Ildefonso Molina

EKG 5/14









(This form is maintained as a part of the permanent medical record)

2015 [employer].  All Rights Reserved

[email or web address] [email or web address]    [phone]

                                                              



 





MTDD

## 2020-07-08 ENCOUNTER — HOSPITAL ENCOUNTER (EMERGENCY)
Dept: HOSPITAL 92 - ERS | Age: 50
Discharge: HOME | End: 2020-07-08
Payer: COMMERCIAL

## 2020-07-08 DIAGNOSIS — E78.00: ICD-10-CM

## 2020-07-08 DIAGNOSIS — U07.1: Primary | ICD-10-CM

## 2020-07-08 LAB
ALBUMIN SERPL BCG-MCNC: 4.2 G/DL (ref 3.5–5)
ALP SERPL-CCNC: 53 U/L (ref 40–110)
ALT SERPL W P-5'-P-CCNC: 36 U/L (ref 8–55)
ANION GAP SERPL CALC-SCNC: 14 MMOL/L (ref 10–20)
AST SERPL-CCNC: 37 U/L (ref 5–34)
BASOPHILS # BLD AUTO: 0 THOU/UL (ref 0–0.2)
BASOPHILS NFR BLD AUTO: 0.6 % (ref 0–1)
BILIRUB SERPL-MCNC: 0.4 MG/DL (ref 0.2–1.2)
BUN SERPL-MCNC: 13 MG/DL (ref 8.9–20.6)
CALCIUM SERPL-MCNC: 8.9 MG/DL (ref 7.8–10.44)
CHLORIDE SERPL-SCNC: 101 MMOL/L (ref 98–107)
CK MB SERPL-MCNC: 3 NG/ML (ref 0–6.6)
CO2 SERPL-SCNC: 27 MMOL/L (ref 22–29)
CREAT CL PREDICTED SERPL C-G-VRATE: 0 ML/MIN (ref 70–130)
DIGOXIN SERPL-MCNC: (no result) NG/ML (ref 0.8–2)
EOSINOPHIL # BLD AUTO: 0 THOU/UL (ref 0–0.7)
EOSINOPHIL NFR BLD AUTO: 0.6 % (ref 0–10)
GLOBULIN SER CALC-MCNC: 3.1 G/DL (ref 2.4–3.5)
GLUCOSE SERPL-MCNC: 89 MG/DL (ref 70–105)
HGB BLD-MCNC: 15.7 G/DL (ref 14–18)
LIPASE SERPL-CCNC: 20 U/L (ref 8–78)
LYMPHOCYTES # BLD: 1.2 THOU/UL (ref 1.2–3.4)
LYMPHOCYTES NFR BLD AUTO: 25.3 % (ref 21–51)
MCH RBC QN AUTO: 33.5 PG (ref 27–31)
MCV RBC AUTO: 96.2 FL (ref 78–98)
MONOCYTES # BLD AUTO: 0.7 THOU/UL (ref 0.11–0.59)
MONOCYTES NFR BLD AUTO: 13.4 % (ref 0–10)
NEUTROPHILS # BLD AUTO: 2.9 THOU/UL (ref 1.4–6.5)
NEUTROPHILS NFR BLD AUTO: 60.2 % (ref 42–75)
PLATELET # BLD AUTO: 108 THOU/UL (ref 130–400)
POTASSIUM SERPL-SCNC: 3.8 MMOL/L (ref 3.5–5.1)
RBC # BLD AUTO: 4.68 MILL/UL (ref 4.7–6.1)
SODIUM SERPL-SCNC: 138 MMOL/L (ref 136–145)
WBC # BLD AUTO: 4.9 THOU/UL (ref 4.8–10.8)

## 2020-07-08 PROCEDURE — 80053 COMPREHEN METABOLIC PANEL: CPT

## 2020-07-08 PROCEDURE — 71275 CT ANGIOGRAPHY CHEST: CPT

## 2020-07-08 PROCEDURE — 71045 X-RAY EXAM CHEST 1 VIEW: CPT

## 2020-07-08 PROCEDURE — 96361 HYDRATE IV INFUSION ADD-ON: CPT

## 2020-07-08 PROCEDURE — 96360 HYDRATION IV INFUSION INIT: CPT

## 2020-07-08 PROCEDURE — 80162 ASSAY OF DIGOXIN TOTAL: CPT

## 2020-07-08 PROCEDURE — 83690 ASSAY OF LIPASE: CPT

## 2020-07-08 PROCEDURE — 82553 CREATINE MB FRACTION: CPT

## 2020-07-08 PROCEDURE — 93005 ELECTROCARDIOGRAM TRACING: CPT

## 2020-07-08 PROCEDURE — 85025 COMPLETE CBC W/AUTO DIFF WBC: CPT

## 2020-07-08 PROCEDURE — 84484 ASSAY OF TROPONIN QUANT: CPT

## 2020-07-08 NOTE — RAD
Exam: Chest one view



HISTORY:COVID positive. Worsening symptoms.



Comparison: 5/19/2020



FINDINGS:

Cardiac silhouette:Cardiomegaly. Limited evaluation left-sided transvenous pacemaker due to underpene
tration from portable technique

Aorta: Unremarkable

Pulmonary vessels: Normal

Costophrenic angles: Possible left-sided effusion.



LUNGS: Left lower lobe of parenchymal changes cannot be excluded.

Pneumothorax: None



Osseous abnormalities: None



IMPRESSION: Limited evaluation left lung base due to portable technique. Pleural and parenchymal jauregui
ges cannot be excluded. Dedicated two-view chest radiograph is recommended.



Reported By: Santy Alfonso 

Electronically Signed:  7/8/2020 12:38 PM

## 2020-07-08 NOTE — CT
CT PULMONARY ANGIOGRAM WITH IV CONTRAST AND 3D POSTPROCESSIN20

 

HISTORY: 

COVID-19 positive. Chest pain, cough, vomiting, chills, fever, myalgia. 

 

FINDINGS: 

No filling defects are seen in the pulmonary arterial vasculature to suggest pulmonary embolism. The 
thoracic aorta is well opacified without aneurysm or dissection. No pleural or pericardial effusions 
are seen. There are patchy ground glass infiltrates in the lung fields bilaterally with peripheral do
minance. There are degenerative changes in the spine. Upper abdominal tomograms are unremarkable.

 

IMPRESSION: 

1. No CT evidence of pulmonary embolism. 

 

2. Findings are consistent with COVID-19 pneumonia. 

 

POS: [facility]

## 2020-07-10 ENCOUNTER — HOSPITAL ENCOUNTER (INPATIENT)
Dept: HOSPITAL 92 - ERS | Age: 50
LOS: 5 days | Discharge: HOME | DRG: 177 | End: 2020-07-15
Attending: HOSPITALIST | Admitting: HOSPITALIST
Payer: COMMERCIAL

## 2020-07-10 VITALS — BODY MASS INDEX: 51.5 KG/M2

## 2020-07-10 DIAGNOSIS — G47.33: ICD-10-CM

## 2020-07-10 DIAGNOSIS — U07.1: Primary | ICD-10-CM

## 2020-07-10 DIAGNOSIS — Z95.0: ICD-10-CM

## 2020-07-10 DIAGNOSIS — E66.01: ICD-10-CM

## 2020-07-10 DIAGNOSIS — J12.89: ICD-10-CM

## 2020-07-10 DIAGNOSIS — J96.01: ICD-10-CM

## 2020-07-10 LAB
ALBUMIN SERPL BCG-MCNC: 3.9 G/DL (ref 3.5–5)
ALP SERPL-CCNC: 40 U/L (ref 40–110)
ALT SERPL W P-5'-P-CCNC: 34 U/L (ref 8–55)
ANALYZER IN CARDIO: (no result)
ANION GAP SERPL CALC-SCNC: 13 MMOL/L (ref 10–20)
APTT PPP: 33.5 SEC (ref 22.9–36.1)
AST SERPL-CCNC: 45 U/L (ref 5–34)
BASE EXCESS STD BLDA CALC-SCNC: -3.8 MEQ/L
BASOPHILS # BLD AUTO: 0 THOU/UL (ref 0–0.2)
BASOPHILS NFR BLD AUTO: 0.1 % (ref 0–1)
BILIRUB SERPL-MCNC: 0.5 MG/DL (ref 0.2–1.2)
BUN SERPL-MCNC: 11 MG/DL (ref 8.9–20.6)
CA-I BLDA-SCNC: 1.07 MMOL/L (ref 1.12–1.3)
CALCIUM SERPL-MCNC: 8.2 MG/DL (ref 7.8–10.44)
CHLORIDE SERPL-SCNC: 102 MMOL/L (ref 98–107)
CO2 SERPL-SCNC: 23 MMOL/L (ref 22–29)
CREAT CL PREDICTED SERPL C-G-VRATE: 0 ML/MIN (ref 70–130)
EOSINOPHIL # BLD AUTO: 0 THOU/UL (ref 0–0.7)
EOSINOPHIL NFR BLD AUTO: 0.3 % (ref 0–10)
GLOBULIN SER CALC-MCNC: 2.9 G/DL (ref 2.4–3.5)
GLUCOSE SERPL-MCNC: 95 MG/DL (ref 70–105)
HCO3 BLDA-SCNC: 18.1 MEQ/L (ref 22–28)
HCT VFR BLDA CALC: 41 % (ref 42–52)
HGB BLD-MCNC: 13.9 G/DL (ref 14–18)
HGB BLDA-MCNC: 13.8 G/DL (ref 14–18)
INR PPP: 1
LYMPHOCYTES # BLD: 0.5 THOU/UL (ref 1.2–3.4)
LYMPHOCYTES NFR BLD AUTO: 14.2 % (ref 21–51)
MCH RBC QN AUTO: 32.9 PG (ref 27–31)
MCV RBC AUTO: 94.3 FL (ref 78–98)
MONOCYTES # BLD AUTO: 0.4 THOU/UL (ref 0.11–0.59)
MONOCYTES NFR BLD AUTO: 11 % (ref 0–10)
NEUTROPHILS # BLD AUTO: 2.8 THOU/UL (ref 1.4–6.5)
NEUTROPHILS NFR BLD AUTO: 74.4 % (ref 42–75)
O2 A-A PPRESDIFF RESPIRATORY: 85.02 MM[HG] (ref 0–20)
PCO2 BLDA: 25.3 MMHG (ref 35–45)
PH BLDA: 7.47 [PH] (ref 7.35–7.45)
PLATELET # BLD AUTO: 111 THOU/UL (ref 130–400)
PO2 BLDA: 83 MMHG (ref 80–100)
POTASSIUM BLD-SCNC: 3.77 MMOL/L (ref 3.7–5.3)
POTASSIUM SERPL-SCNC: 3.9 MMOL/L (ref 3.5–5.1)
PROTHROMBIN TIME: 13 SEC (ref 12–14.7)
RBC # BLD AUTO: 4.22 MILL/UL (ref 4.7–6.1)
SODIUM SERPL-SCNC: 134 MMOL/L (ref 136–145)
SPECIMEN DRAWN FROM PATIENT: (no result)
WBC # BLD AUTO: 3.8 THOU/UL (ref 4.8–10.8)

## 2020-07-10 PROCEDURE — 85730 THROMBOPLASTIN TIME PARTIAL: CPT

## 2020-07-10 PROCEDURE — 80053 COMPREHEN METABOLIC PANEL: CPT

## 2020-07-10 PROCEDURE — 82553 CREATINE MB FRACTION: CPT

## 2020-07-10 PROCEDURE — 71045 X-RAY EXAM CHEST 1 VIEW: CPT

## 2020-07-10 PROCEDURE — 85025 COMPLETE CBC W/AUTO DIFF WBC: CPT

## 2020-07-10 PROCEDURE — 71275 CT ANGIOGRAPHY CHEST: CPT

## 2020-07-10 PROCEDURE — 87324 CLOSTRIDIUM AG IA: CPT

## 2020-07-10 PROCEDURE — 86140 C-REACTIVE PROTEIN: CPT

## 2020-07-10 PROCEDURE — 87449 NOS EACH ORGANISM AG IA: CPT

## 2020-07-10 PROCEDURE — 8E0ZXY6 ISOLATION: ICD-10-PCS | Performed by: HOSPITALIST

## 2020-07-10 PROCEDURE — 80162 ASSAY OF DIGOXIN TOTAL: CPT

## 2020-07-10 PROCEDURE — 83630 LACTOFERRIN FECAL (QUAL): CPT

## 2020-07-10 PROCEDURE — 93005 ELECTROCARDIOGRAM TRACING: CPT

## 2020-07-10 PROCEDURE — 96374 THER/PROPH/DIAG INJ IV PUSH: CPT

## 2020-07-10 PROCEDURE — 36415 COLL VENOUS BLD VENIPUNCTURE: CPT

## 2020-07-10 PROCEDURE — 82728 ASSAY OF FERRITIN: CPT

## 2020-07-10 PROCEDURE — 85610 PROTHROMBIN TIME: CPT

## 2020-07-10 PROCEDURE — 96360 HYDRATION IV INFUSION INIT: CPT

## 2020-07-10 PROCEDURE — 84484 ASSAY OF TROPONIN QUANT: CPT

## 2020-07-10 PROCEDURE — 83690 ASSAY OF LIPASE: CPT

## 2020-07-10 PROCEDURE — 96361 HYDRATE IV INFUSION ADD-ON: CPT

## 2020-07-10 PROCEDURE — 82805 BLOOD GASES W/O2 SATURATION: CPT

## 2020-07-10 RX ADMIN — Medication SCH ML: at 19:56

## 2020-07-10 NOTE — RAD
EXAM: 

CHEST ONE VIEW



HISTORY:

Covid positive. Chest pain.



COMPARISON:

CTA chest on 7/8/2020



FINDINGS:

Dual lead left subclavian cardiac pacemaking device is noted in place. Cardiac silhouette appears enl
arged. This does preclude evaluation of the left midlung zone and left lung base. There is

prominence of the perihilar interstitial densities, but this may be secondary to the shallow depth in
spiration and portable technique of the study. Multiple scattered peripherally located groundglass

densities were seen within the lungs on prior CTA thorax which are not well visualized on this exam. 
Osseous structures have a normal appearance.



IMPRESSION:



1. Cardiomegaly.

2. The previously seen groundglass density within the lungs on CTA chest are not visualized on this e
xamination, but radiographs exhibit low sensitivity for evaluation of groundglass opacities.



Reported By: Mamadou Roth 

Electronically Signed:  7/10/2020 3:25 PM

## 2020-07-10 NOTE — HP
CHIEF COMPLAINT:  Shortness of breath.



HISTORY OF PRESENT ILLNESS:  The patient is a 49-year-old male, who initially

received a COVID testing, I believe approximately on .  The patient had

symptoms.  He had generalized body aches and pains, fever.  He states he lives with

his brother.  Most likely, got COVID from his brother.  The patient at this time was

tested positive on .  However, the patient was not admitted to the hospital

since he was not hypoxic, just had flu-like symptoms.  The patient stated that since

then he has deteriorated to the point that he has been having more fevers on and

off, body aches and pains, diarrhea.  He was very short of breath and also has been

very drowsy and sleepy. 



PAST MEDICAL HISTORY:  As of the followin. Obesity.

2. Obstructive sleep apnea.

3. Bradycardia status post pacemaker placement.



PAST SURGICAL HISTORY:  He has had a pacemaker placement which was May of 2020.



FAMILY HISTORY:  History of obesity.



SOCIAL HISTORY:  Denies any smoking, drinking, or alcohol abuse.  He ambulates

independently. 



REVIEW OF SYSTEMS:  All negative except for the ones mentioned above in the HPI.



PHYSICAL EXAMINATION:

VITAL SIGNS:  As of the following; temperature of 100.4, blood pressure 110/58, 66,

20, 94% on 2 L. 

GENERAL:  He is awake, alert, and oriented x3.  Does not appear in any distress. 

CV:  S1, S2 present.  No murmurs, rubs, or gallops. 

LUNGS:  Clear to auscultation.  No rhonchi or wheezes noted. 

ABDOMEN:  Obese.  Bowel sounds are present x2. 

EXTREMITIES:  No edema.  Pedal pulses are present x2.  Neurovascular-wise, no focal

deficits noted. 

SKIN:  No cuts, lesions or bruises noted.



LABORATORY DATA:  His pH was 7.47, pCO2 of 25.3, O2 was 83.  However, he was noted

to be hypoxic when doing vital signs.  This ABG was on oxygen.  His chest x-ray

indicated ground-glass opacity.  Sodium 134, potassium of 3.9, BUN of 11, creatinine

0.81, AST is 45, ALT is 34.  WBCs of 3.8, hemoglobin of 13.9, hematocrit of 39.8,

platelets of 111.  His lymphocyte is 0.5.  CRP and ferritin are pending. 



ASSESSMENT AND PLAN:  The patient is a very pleasant 49-year-old male, who presents

to the hospital with complaints of shortness of breath. 

1. Acute hypoxia, most likely secondary from COVID pneumonia.  We will start patient

on some Decadron.  The patient is hypoxic.  We will start deep venous thrombosis

prophylaxis.  We will continue to monitor the patient.  This maybe the 10th day of

patient's symptoms.  I will hold off on any remdesivir from now.  The patient states

that he feels a lot better just with oxygen.  We will watch and see what his CRP and

ferritin is. 

2. Obesity.  We will start patient on his CPAP.  We will continue to monitor.

3. Obstructive sleep apnea.  We will continue his CPAP.

4. Deep venous thrombosis prophylaxis.  We will put patient on SCDs.

5. COVID pneumonia.  Again, symptomatic management.  We will start him on

dexamethasone daily, PPI, and continue to monitor him. 







Job ID:  [id number]

## 2020-07-11 LAB
ALBUMIN SERPL BCG-MCNC: 3.5 G/DL (ref 3.5–5)
ALP SERPL-CCNC: 41 U/L (ref 40–110)
ALT SERPL W P-5'-P-CCNC: 30 U/L (ref 8–55)
ANION GAP SERPL CALC-SCNC: 12 MMOL/L (ref 10–20)
AST SERPL-CCNC: 43 U/L (ref 5–34)
BASOPHILS # BLD AUTO: 0 THOU/UL (ref 0–0.2)
BASOPHILS NFR BLD AUTO: 1.9 % (ref 0–1)
BILIRUB SERPL-MCNC: 0.4 MG/DL (ref 0.2–1.2)
BUN SERPL-MCNC: 12 MG/DL (ref 8.9–20.6)
CALCIUM SERPL-MCNC: 7.8 MG/DL (ref 7.8–10.44)
CHLORIDE SERPL-SCNC: 106 MMOL/L (ref 98–107)
CO2 SERPL-SCNC: 23 MMOL/L (ref 22–29)
CREAT CL PREDICTED SERPL C-G-VRATE: 290 ML/MIN (ref 70–130)
EOSINOPHIL # BLD AUTO: 0 THOU/UL (ref 0–0.7)
EOSINOPHIL NFR BLD AUTO: 0.3 % (ref 0–10)
GLOBULIN SER CALC-MCNC: 3 G/DL (ref 2.4–3.5)
GLUCOSE SERPL-MCNC: 162 MG/DL (ref 70–105)
HGB BLD-MCNC: 13.2 G/DL (ref 14–18)
LYMPHOCYTES # BLD: 0.4 THOU/UL (ref 1.2–3.4)
LYMPHOCYTES NFR BLD AUTO: 16.7 % (ref 21–51)
MCH RBC QN AUTO: 31.2 PG (ref 27–31)
MCV RBC AUTO: 94.6 FL (ref 78–98)
MONOCYTES # BLD AUTO: 0.2 THOU/UL (ref 0.11–0.59)
MONOCYTES NFR BLD AUTO: 6.1 % (ref 0–10)
NEUTROPHILS # BLD AUTO: 1.9 THOU/UL (ref 1.4–6.5)
NEUTROPHILS NFR BLD AUTO: 74.9 % (ref 42–75)
PLATELET # BLD AUTO: 120 THOU/UL (ref 130–400)
POTASSIUM SERPL-SCNC: 4.1 MMOL/L (ref 3.5–5.1)
RBC # BLD AUTO: 4.24 MILL/UL (ref 4.7–6.1)
SODIUM SERPL-SCNC: 137 MMOL/L (ref 136–145)
WBC # BLD AUTO: 2.5 THOU/UL (ref 4.8–10.8)

## 2020-07-11 RX ADMIN — Medication SCH ML: at 19:44

## 2020-07-11 RX ADMIN — ASPIRIN SCH MG: 81 TABLET ORAL at 07:37

## 2020-07-11 RX ADMIN — Medication SCH: at 07:39

## 2020-07-12 LAB
ALBUMIN SERPL BCG-MCNC: 3.6 G/DL (ref 3.5–5)
ALP SERPL-CCNC: 37 U/L (ref 40–110)
ALT SERPL W P-5'-P-CCNC: 27 U/L (ref 8–55)
ANION GAP SERPL CALC-SCNC: 10 MMOL/L (ref 10–20)
AST SERPL-CCNC: 34 U/L (ref 5–34)
BASOPHILS # BLD AUTO: 0 THOU/UL (ref 0–0.2)
BASOPHILS NFR BLD AUTO: 0.3 % (ref 0–1)
BILIRUB SERPL-MCNC: 0.5 MG/DL (ref 0.2–1.2)
BUN SERPL-MCNC: 13 MG/DL (ref 8.9–20.6)
CALCIUM SERPL-MCNC: 8.1 MG/DL (ref 7.8–10.44)
CHLORIDE SERPL-SCNC: 105 MMOL/L (ref 98–107)
CO2 SERPL-SCNC: 27 MMOL/L (ref 22–29)
CREAT CL PREDICTED SERPL C-G-VRATE: 266 ML/MIN (ref 70–130)
CRP SERPL-MCNC: 4.35 MG/DL
EOSINOPHIL # BLD AUTO: 0 THOU/UL (ref 0–0.7)
EOSINOPHIL NFR BLD AUTO: 0.1 % (ref 0–10)
GLOBULIN SER CALC-MCNC: 2.7 G/DL (ref 2.4–3.5)
GLUCOSE SERPL-MCNC: 108 MG/DL (ref 70–105)
HGB BLD-MCNC: 13.3 G/DL (ref 14–18)
LYMPHOCYTES # BLD: 0.7 THOU/UL (ref 1.2–3.4)
LYMPHOCYTES NFR BLD AUTO: 8.6 % (ref 21–51)
MCH RBC QN AUTO: 32.4 PG (ref 27–31)
MCV RBC AUTO: 95.2 FL (ref 78–98)
MONOCYTES # BLD AUTO: 0.6 THOU/UL (ref 0.11–0.59)
MONOCYTES NFR BLD AUTO: 6.5 % (ref 0–10)
NEUTROPHILS # BLD AUTO: 7.3 THOU/UL (ref 1.4–6.5)
NEUTROPHILS NFR BLD AUTO: 84.5 % (ref 42–75)
PLATELET # BLD AUTO: 147 THOU/UL (ref 130–400)
POTASSIUM SERPL-SCNC: 3.6 MMOL/L (ref 3.5–5.1)
RBC # BLD AUTO: 4.1 MILL/UL (ref 4.7–6.1)
SODIUM SERPL-SCNC: 138 MMOL/L (ref 136–145)
WBC # BLD AUTO: 8.6 THOU/UL (ref 4.8–10.8)

## 2020-07-12 RX ADMIN — Medication SCH ML: at 22:10

## 2020-07-12 RX ADMIN — Medication SCH MG: at 10:07

## 2020-07-12 RX ADMIN — ASPIRIN SCH MG: 81 TABLET ORAL at 10:07

## 2020-07-12 RX ADMIN — Medication SCH ML: at 10:08

## 2020-07-12 NOTE — PDOC.HOSPP
- Subjective


Encounter Date: 07/12/20


Encounter Time: 11:30


Subjective: 





pt up in bed states he feels a bit better





- Objective


Vital Signs & Weight: 


 Vital Signs (12 hours)











  Pulse Ox


 


 07/12/20 08:00  94 L








 Weight











Admit Weight                   380 lb


 


Weight                         380 lb














I&O: 


 











 07/11/20 07/12/20 07/13/20





 06:59 06:59 06:59


 


Intake Total  2000 


 


Balance  2000 











Result Diagrams: 


 07/12/20 10:51





 07/12/20 10:51





Hospitalist ROS





- Review of Systems


Cardiovascular: denies: chest pain, palpitations, orthopnea, paroxysmal noc. 

dyspnea, edema, light headedness, other


Gastrointestinal: denies: nausea, vomiting, abdominal pain, diarrhea, 

constipation, melena, hematochezia, other


Genitourinary: denies: dysuria, frequency, incontinence, hematuria, retention, 

other





- Medication


Medications: 


Active Medications











Generic Name Dose Route Start Last Admin





  Trade Name Freq  PRN Reason Stop Dose Admin


 


Acetaminophen  650 mg  07/10/20 16:08  07/12/20 01:53





  Tylenol  PO   650 mg





  Q4H PRN   Administration





  Headache/Fever/Mild Pain (1-3)   





     





     





     


 


Ascorbic Acid  1,000 mg  07/12/20 09:00  07/12/20 10:07





  Vitamin C  PO   1,000 mg





  DAILY JABIER   Administration





     





     





     





     


 


Aspirin  81 mg  07/11/20 09:00  07/12/20 10:07





  Ecotrin  PO   81 mg





  DAILY JABIER   Administration





     





     





     





     


 


Atorvastatin Calcium  40 mg  07/10/20 21:00  07/11/20 19:44





  Lipitor  PO   40 mg





  HS JABIER   Administration





     





     





     





     


 


Dexamethasone  6 mg  07/11/20 09:00  07/12/20 10:05





  Decadron  SLOW IVP   6 mg





  DAILY JABIER   Administration





     





     





     





     


 


Enoxaparin Sodium  40 mg  07/11/20 09:00  07/12/20 10:05





  Lovenox  SC   40 mg





  0900 JABIER   Administration





     





     





     





     


 


Pantoprazole Sodium  40 mg  07/11/20 09:00  07/12/20 10:07





  Protonix  PO   40 mg





  DAILY JABIER   Administration





     





     





     





     


 


Sodium Chloride  10 ml  07/10/20 21:00  07/12/20 10:08





  Flush - Normal Saline  IVF   10 ml





  Q12HR JABIER   Administration





     





     





     





     


 


Zinc Sulfate  220 mg  07/12/20 09:00  07/12/20 10:07





  Zinc Sulfate  PO   220 mg





  DAILY JABIER   Administration





     





     





     





     














- Exam


Neck: negative: supple, symmetric, no JVD, no thyromegaly, no lymphadenopathy, 

no carotid bruit, JVD


Heart: negative: RRR, no murmur, no gallops, no rubs, normal peripheral pulses, 

irregular, diminshed peripheral pulses, murmur present, II/IV, III/IV


Respiratory: rales


Gastrointestinal: negative: soft, non-tender, non-distended, normal bowel sounds

, no palpable masses, no hepatomegaly, no splenomegaly, no bruit, no guarding, 

no rigidity, tender to palpation, distended, diminished bowl sounds, voluntary 

guarding





Hosp A/P


(1) Acute respiratory failure with hypoxia


Code(s): J96.01 - ACUTE RESPIRATORY FAILURE WITH HYPOXIA   Status: Acute   





(2) COVID-19


Code(s): U07.1 - COVID-19   Status: Acute   





(3) Morbid obesity


Code(s): E66.01 - MORBID (SEVERE) OBESITY DUE TO EXCESS CALORIES   Status: 

Chronic   





(4) MAGGIE (obstructive sleep apnea)


Code(s): G47.33 - OBSTRUCTIVE SLEEP APNEA (ADULT) (PEDIATRIC)   Status: Chronic

   





- Plan





will continue steroids for now. pt continues to have diarrhea most likely due 

to covid. will check diarrhea for stool studies. pt states he feels well on 

oxygen will continue. pt on dvt ppx. will add vit c and zinc. 





7/12 pt's inflammatory markers are improving. His stool studies are negative. 

He has no more diarrhea. possible discharge in the next 24-48hr.

## 2020-07-13 RX ADMIN — Medication SCH ML: at 07:49

## 2020-07-13 RX ADMIN — Medication SCH MG: at 07:47

## 2020-07-13 RX ADMIN — Medication SCH ML: at 21:43

## 2020-07-13 RX ADMIN — ASPIRIN SCH MG: 81 TABLET ORAL at 07:48

## 2020-07-13 NOTE — PDOC.HOSPP
- Subjective


Encounter Date: 07/13/20


Encounter Time: 10:00


Subjective: 





pt up in bed no complains. pt states that he was feeling unwell earlier but now 

he feels well. 





- Objective


Vital Signs & Weight: 


 Vital Signs (12 hours)











  Temp Pulse Resp BP Pulse Ox


 


 07/13/20 11:00  99.3 F  72  20  115/60  93 L


 


 07/13/20 08:00      90 L


 


 07/13/20 07:33  100.9 F H  71  18  102/67  90 L


 


 07/13/20 03:33  103.1 F H  83  20  98/59 L  92 L








 Weight











Admit Weight                   380 lb


 


Weight                         380 lb














I&O: 


 











 07/12/20 07/13/20 07/14/20





 06:59 06:59 06:59


 


Intake Total 2000  


 


Balance 2000  











Result Diagrams: 


 07/12/20 10:51





 07/12/20 10:51





Hospitalist ROS





- Review of Systems


Cardiovascular: denies: chest pain, palpitations, orthopnea, paroxysmal noc. 

dyspnea, edema, light headedness, other


Gastrointestinal: denies: nausea, vomiting, abdominal pain, diarrhea, 

constipation, melena, hematochezia, other


Genitourinary: denies: dysuria, frequency, incontinence, hematuria, retention, 

other





- Medication


Medications: 


Active Medications











Generic Name Dose Route Start Last Admin





  Trade Name Cherie  PRN Reason Stop Dose Admin


 


Acetaminophen  650 mg  07/10/20 16:08  07/13/20 07:48





  Tylenol  PO   650 mg





  Q4H PRN   Administration





  Headache/Fever/Mild Pain (1-3)   





     





     





     


 


Ascorbic Acid  1,000 mg  07/12/20 09:00  07/13/20 07:47





  Vitamin C  PO   1,000 mg





  DAILY JABIER   Administration





     





     





     





     


 


Aspirin  81 mg  07/11/20 09:00  07/13/20 07:48





  Ecotrin  PO   81 mg





  DAILY JABIER   Administration





     





     





     





     


 


Atorvastatin Calcium  40 mg  07/10/20 21:00  07/12/20 22:10





  Lipitor  PO   40 mg





  HS JAIBER   Administration





     





     





     





     


 


Dexamethasone  6 mg  07/11/20 09:00  07/13/20 07:48





  Decadron  SLOW IVP   6 mg





  DAILY JABIER   Administration





     





     





     





     


 


Enoxaparin Sodium  40 mg  07/11/20 09:00  07/13/20 07:47





  Lovenox  SC   40 mg





  0900 JABIER   Administration





     





     





     





     


 


Pantoprazole Sodium  40 mg  07/11/20 09:00  07/13/20 07:48





  Protonix  PO   40 mg





  DAILY JABIER   Administration





     





     





     





     


 


Sodium Chloride  10 ml  07/10/20 21:00  07/13/20 07:49





  Flush - Normal Saline  IVF   10 ml





  Q12HR JABIER   Administration





     





     





     





     


 


Zinc Sulfate  220 mg  07/12/20 09:00  07/13/20 07:48





  Zinc Sulfate  PO   220 mg





  DAILY JABIER   Administration





     





     





     





     














- Exam


Neck: negative: supple, symmetric, no JVD, no thyromegaly, no lymphadenopathy, 

no carotid bruit, JVD


Heart: negative: RRR, no murmur, no gallops, no rubs, normal peripheral pulses, 

irregular, diminshed peripheral pulses, murmur present, II/IV, III/IV


Respiratory: negative: CTAB, no wheezes, no rales, no ronchi, normal chest 

expansion, no tachypnea, normal percussion, rales, rhonchi, tachypneic, wheezes


Gastrointestinal: negative: soft, non-tender, non-distended, normal bowel sounds

, no palpable masses, no hepatomegaly, no splenomegaly, no bruit, no guarding, 

no rigidity, tender to palpation, distended, diminished bowl sounds, voluntary 

guarding





Hosp A/P


(1) Acute respiratory failure with hypoxia


Code(s): J96.01 - ACUTE RESPIRATORY FAILURE WITH HYPOXIA   Status: Acute   





(2) COVID-19


Code(s): U07.1 - COVID-19   Status: Acute   





(3) Morbid obesity


Code(s): E66.01 - MORBID (SEVERE) OBESITY DUE TO EXCESS CALORIES   Status: 

Chronic   





(4) MAGGIE (obstructive sleep apnea)


Code(s): G47.33 - OBSTRUCTIVE SLEEP APNEA (ADULT) (PEDIATRIC)   Status: Chronic

   





- Plan





will continue steroids for now. pt continues to have diarrhea most likely due 

to covid. will check diarrhea for stool studies. pt states he feels well on 

oxygen will continue. pt on dvt ppx. will add vit c and zinc. 





7/12 pt's inflammatory markers are improving. His stool studies are negative. 

He has no more diarrhea. possible discharge in the next 24-48hr. 





7/13 pt's inflammatory markers are slightly higher than before. Pt has no more 

diarrhea. pt still on oxygen. will continue to monitor. will continues 

steroids.

## 2020-07-13 NOTE — RAD
CHEST 1 VIEW:

 

HISTORY: 

Shortness of breath.

 

COMPARISON: 

Radiograph 7/10/2020.

 

FINDINGS: 

Heart size is enlarged.  Dual-lead pacer is similar.

 

Multifocal airspace opacities are relatively similar.  Lungs are hypoinflated.

 

IMPRESSION: 

Similar examination of the chest without significant worsening.

 

POS: [facility]

## 2020-07-14 RX ADMIN — Medication SCH ML: at 07:56

## 2020-07-14 RX ADMIN — Medication SCH MG: at 07:55

## 2020-07-14 RX ADMIN — ASPIRIN SCH MG: 81 TABLET ORAL at 07:55

## 2020-07-14 RX ADMIN — Medication SCH ML: at 20:23

## 2020-07-14 NOTE — PDOC.HOSPP
- Subjective


Encounter Date: 07/14/20


Encounter Time: 09:55


Subjective: 





pt up in chair feels sob, no fever in the past 24hr. pt does not feel 

comfortable going home. 





- Objective


Vital Signs & Weight: 


 Vital Signs (12 hours)











  Temp Pulse Resp BP Pulse Ox


 


 07/14/20 15:00  98.5 F  70  20  112/68  91 L


 


 07/14/20 11:00  98.6 F  67  20  135/80  92 L


 


 07/14/20 08:00      91 L


 


 07/14/20 07:00  98.5 F  66  20  137/82  91 L








 Weight











Admit Weight                   380 lb


 


Weight                         380 lb














Result Diagrams: 


 07/12/20 10:51





 07/12/20 10:51





Hospitalist ROS





- Review of Systems


Cardiovascular: denies: chest pain, palpitations, orthopnea, paroxysmal noc. 

dyspnea, edema, light headedness, other


Gastrointestinal: denies: nausea, vomiting, abdominal pain, diarrhea, 

constipation, melena, hematochezia, other


Genitourinary: denies: dysuria, frequency, incontinence, hematuria, retention, 

other





- Medication


Medications: 


Active Medications











Generic Name Dose Route Start Last Admin





  Trade Name Freq  PRN Reason Stop Dose Admin


 


Acetaminophen  650 mg  07/10/20 16:08  07/14/20 07:55





  Tylenol  PO   650 mg





  Q4H PRN   Administration





  Headache/Fever/Mild Pain (1-3)   





     





     





     


 


Ascorbic Acid  1,000 mg  07/12/20 09:00  07/14/20 07:55





  Vitamin C  PO   1,000 mg





  DAILY JABIER   Administration





     





     





     





     


 


Aspirin  81 mg  07/11/20 09:00  07/14/20 07:55





  Ecotrin  PO   81 mg





  DAILY JABIER   Administration





     





     





     





     


 


Atorvastatin Calcium  40 mg  07/10/20 21:00  07/13/20 21:43





  Lipitor  PO   40 mg





  HS JABIER   Administration





     





     





     





     


 


Dexamethasone  6 mg  07/11/20 09:00  07/14/20 07:55





  Decadron  SLOW IVP   6 mg





  DAILY JABIER   Administration





     





     





     





     


 


Pantoprazole Sodium  40 mg  07/11/20 09:00  07/14/20 07:55





  Protonix  PO   40 mg





  DAILY JABIER   Administration





     





     





     





     


 


Sodium Chloride  10 ml  07/10/20 21:00  07/14/20 07:56





  Flush - Normal Saline  IVF   10 ml





  Q12HR JABIER   Administration





     





     





     





     


 


Zinc Sulfate  220 mg  07/12/20 09:00  07/14/20 07:56





  Zinc Sulfate  PO   220 mg





  DAILY JABIER   Administration





     





     





     





     














- Exam


Heart: negative: RRR, no murmur, no gallops, no rubs, normal peripheral pulses, 

irregular, diminshed peripheral pulses, murmur present, II/IV, III/IV


Respiratory: negative: CTAB, no wheezes, no rales, no ronchi, normal chest 

expansion, no tachypnea, normal percussion, rales, rhonchi, tachypneic, wheezes


Gastrointestinal: negative: soft, non-tender, non-distended, normal bowel sounds

, no palpable masses, no hepatomegaly, no splenomegaly, no bruit, no guarding, 

no rigidity, tender to palpation, distended, diminished bowl sounds, voluntary 

guarding


Extremities: 1+ LE edema





Hosp A/P


(1) Acute respiratory failure with hypoxia


Code(s): J96.01 - ACUTE RESPIRATORY FAILURE WITH HYPOXIA   Status: Acute   





(2) COVID-19


Code(s): U07.1 - COVID-19   Status: Acute   





(3) Morbid obesity


Code(s): E66.01 - MORBID (SEVERE) OBESITY DUE TO EXCESS CALORIES   Status: 

Chronic   





(4) MAGGIE (obstructive sleep apnea)


Code(s): G47.33 - OBSTRUCTIVE SLEEP APNEA (ADULT) (PEDIATRIC)   Status: Chronic

   





- Plan





will continue steroids for now. pt continues to have diarrhea most likely due 

to covid. will check diarrhea for stool studies. pt states he feels well on 

oxygen will continue. pt on dvt ppx. will add vit c and zinc. 





7/12 pt's inflammatory markers are improving. His stool studies are negative. 

He has no more diarrhea. possible discharge in the next 24-48hr. 





7/13 pt's inflammatory markers are slightly higher than before. Pt has no more 

diarrhea. pt still on oxygen. will continue to monitor. will continues 

steroids. 





7/14 will check inflammatory markers in am if stable will be discharged. He may 

need home oxygen. no need for abx.

## 2020-07-15 VITALS — DIASTOLIC BLOOD PRESSURE: 76 MMHG | SYSTOLIC BLOOD PRESSURE: 128 MMHG | TEMPERATURE: 97.8 F

## 2020-07-15 LAB
ALBUMIN SERPL BCG-MCNC: 3.3 G/DL (ref 3.5–5)
ALP SERPL-CCNC: 53 U/L (ref 40–110)
ALT SERPL W P-5'-P-CCNC: 37 U/L (ref 8–55)
ANION GAP SERPL CALC-SCNC: 13 MMOL/L (ref 10–20)
AST SERPL-CCNC: 34 U/L (ref 5–34)
BASOPHILS # BLD AUTO: 0 THOU/UL (ref 0–0.2)
BASOPHILS NFR BLD AUTO: 0.1 % (ref 0–1)
BILIRUB SERPL-MCNC: 0.3 MG/DL (ref 0.2–1.2)
BUN SERPL-MCNC: 14 MG/DL (ref 8.9–20.6)
CALCIUM SERPL-MCNC: 8.3 MG/DL (ref 7.8–10.44)
CHLORIDE SERPL-SCNC: 105 MMOL/L (ref 98–107)
CO2 SERPL-SCNC: 25 MMOL/L (ref 22–29)
CREAT CL PREDICTED SERPL C-G-VRATE: 262 ML/MIN (ref 70–130)
CRP SERPL-MCNC: 7.52 MG/DL
EOSINOPHIL # BLD AUTO: 0.1 THOU/UL (ref 0–0.7)
EOSINOPHIL NFR BLD AUTO: 0.7 % (ref 0–10)
GLOBULIN SER CALC-MCNC: 3 G/DL (ref 2.4–3.5)
GLUCOSE SERPL-MCNC: 104 MG/DL (ref 70–105)
HGB BLD-MCNC: 12.6 G/DL (ref 14–18)
LYMPHOCYTES # BLD: 0.8 THOU/UL (ref 1.2–3.4)
LYMPHOCYTES NFR BLD AUTO: 9.5 % (ref 21–51)
MCH RBC QN AUTO: 31 PG (ref 27–31)
MCV RBC AUTO: 96.3 FL (ref 78–98)
MONOCYTES # BLD AUTO: 0.6 THOU/UL (ref 0.11–0.59)
MONOCYTES NFR BLD AUTO: 6.2 % (ref 0–10)
NEUTROPHILS # BLD AUTO: 7.4 THOU/UL (ref 1.4–6.5)
NEUTROPHILS NFR BLD AUTO: 83.5 % (ref 42–75)
PLATELET # BLD AUTO: 205 THOU/UL (ref 130–400)
POTASSIUM SERPL-SCNC: 4.2 MMOL/L (ref 3.5–5.1)
RBC # BLD AUTO: 4.07 MILL/UL (ref 4.7–6.1)
SODIUM SERPL-SCNC: 139 MMOL/L (ref 136–145)
WBC # BLD AUTO: 8.8 THOU/UL (ref 4.8–10.8)

## 2020-07-15 RX ADMIN — ASPIRIN SCH MG: 81 TABLET ORAL at 07:52

## 2020-07-15 RX ADMIN — Medication SCH ML: at 07:54

## 2020-07-15 RX ADMIN — Medication SCH MG: at 07:53

## 2020-07-16 NOTE — DIS
DATE OF ADMISSION:  07/10/2020



DATE OF DISCHARGE:  07/15/2020



DISCHARGE DIAGNOSES:  

1. Acute hypoxic respiratory failure.

2. COVID pneumonia.

3. Obesity.

4. Bradycardia, has pacemaker.



HOSPITAL COURSE:  The patient is a 49-year-old male, who initially presented to the

hospital with worsening shortness of breath.  He was found to be very lethargic and

was also found to be hypoxic.  He was put on oxygen.  He was put on steroids.  His

COVID was positive.  The patient was continued to monitor through the hospital stay.

 He did have some diarrhea.  Stool studies were checked, which were all negative for

any infectious etiology.  The patient at this time, continued to improve.  He will

be discharged home.  He will follow up with his primary care.  He will go home on

steroids and oxygen.  Given his significant comorbidities, especially with his

obesity, I have continued his Eliquis.  I have put him on Eliquis 5 mg twice a day

given his body habitus and for DVT prophylaxis for about 15 days, then it will be

stopped.  I have explained to him the risks and benefits of bleeding and to get help

if that occurs.  Also, he will be on Decadron 6 mg daily for a total of 10 days.  He

also went home with oxygen. 



PHYSICAL EXAMINATION:

VITAL SIGNS:  His vitals on discharge were 97.8, 72, 20, 94% on 3 L, 128/76. 

GENERAL:  He is awake, alert, and oriented x3.  Does not appear in distress. 

CV:  S1, S2 present.  No murmurs, rubs, or gallops. 



Again, he has been COVID positive for about more than 13 to 14 days.  I believe he

will do well at home. 







Job ID:  [id number]